# Patient Record
Sex: MALE | Race: WHITE | NOT HISPANIC OR LATINO | URBAN - METROPOLITAN AREA
[De-identification: names, ages, dates, MRNs, and addresses within clinical notes are randomized per-mention and may not be internally consistent; named-entity substitution may affect disease eponyms.]

---

## 2022-11-02 ENCOUNTER — CLINICAL SUPPORT (OUTPATIENT)
Dept: REHABILITATION | Facility: HOSPITAL | Age: 57
End: 2022-11-02
Attending: SURGERY
Payer: MEDICAID

## 2022-11-02 DIAGNOSIS — M79.89 SWELLING OF RIGHT HAND: ICD-10-CM

## 2022-11-02 DIAGNOSIS — M19.041 ARTHROPATHY OF RIGHT HAND: ICD-10-CM

## 2022-11-02 DIAGNOSIS — M79.641 RIGHT HAND PAIN: ICD-10-CM

## 2022-11-02 DIAGNOSIS — M25.641 STIFFNESS OF RIGHT HAND JOINT: ICD-10-CM

## 2022-11-02 DIAGNOSIS — Z78.9 DIFFICULTY WITH ACTIVITIES OF DAILY LIVING: ICD-10-CM

## 2022-11-02 DIAGNOSIS — M19.041 ARTHROPATHY OF RIGHT HAND: Primary | ICD-10-CM

## 2022-11-02 PROCEDURE — L3806 WHFO W/JOINT(S) CUSTOM FAB: HCPCS | Mod: PN

## 2022-11-02 NOTE — PROGRESS NOTES
[facility]  Orthosis Note - Certificate of Medical Necessity      Date: 11/2/2022  Name: Donovan Bowen  Clinic Number: [id number]    Medical Diagnosis: Arthritis of right hand [M19.041]   Therapy Diagnosis:   Encounter Diagnoses   Name Primary?    Arthropathy of right hand     Swelling of right hand     Right hand pain     Stiffness of right hand joint     Difficulty with activities of daily living      Precautions: Standard and Weightbearing    Physician: Sid Esquivel MD  Physician Orders: Fabricate splint, eval and treat   Date of Order: 10/25/22    Insurance Authorization period Expiration: 12/28/22    Time In:1530  Time Out: 1645  [facility] Level II Code and Description:     Subjective     Involved Side: Right  Date of Onset: 10/26/22  Date of Surgery: 10/26/22  Surgical Procedure: MP Arthoplasty for PA  History of Current Condition: Donovan reports a long history of long finger metacarpophalangeal joint pain following an injury ~10 years prior. Dr. Esquivel performed a right long finger metacarpophalangeal joint arthroplasty on 10/26/22. He presents to OT today for fabrication of a custom dynamic extension orthosis.   Previous Therapy: N    Past Medical History/Physical Systems Review:   No past medical history on file.  Donovan Bowen  has no past surgical history on file.    Donovan currently has no medications in their medication list.    Review of patient's allergies indicates:  Not on File     Objective      None Pitting Mild Moderate Severe   Edema     X        No Deficits Mild Deficits Mod Deficits Severe Deficits   ROM    X         Custom Fabricated Orthosis  The custom orthosis was fabricated as requested using low-temperature plastic material. A pattern was measured then cut and custom molded to accommodate this individual patient.   () Long Arm Orthosis  (X) Forearm-Based Orthosis  () Radial-Based Orthosis  () Ulnar-Based Orthosis  () Hand-Based Orthosis  ()  Finger-Based Orthosis  () Full Circumference  () Half Circumference  () Static  () Static Progressive  () Dynamic    Purpose of Orthosis  () Protect Recent Injury   (X) Protect Surgical Procedure  (X) Maintain Joint Positioning  () Increase Motion    HEP / Patient Instructions      See patient instructions section for orthosis instructions.  Patient was instructed verbally, signed, and provided with copy of written proof of delivery as well as information regarding wear schedule, care, and precautions of orthosis.    Assessment      The custom orthosis appeared to fit well with no problems noted. There were no complaints of discomfort from the patient at this time. The patient demonstrated competency with independently doffing and donning orthosis.    Plan     Frequency and Duration: 3x/ week   Orthosis to be worn at all times except for hygiene and to perform home exercise program.  Orthosis checks PRN.    I certify that the orthosis was performed by or under the direct supervision of a qualified Occupational Therapist.   Therapist: BARBARA Salcedo

## 2022-11-07 PROBLEM — M79.89 SWELLING OF RIGHT HAND: Status: ACTIVE | Noted: 2022-11-07

## 2022-11-07 PROBLEM — M25.641 STIFFNESS OF RIGHT HAND JOINT: Status: ACTIVE | Noted: 2022-11-07

## 2022-11-07 PROBLEM — Z78.9 DIFFICULTY WITH ACTIVITIES OF DAILY LIVING: Status: ACTIVE | Noted: 2022-11-07

## 2022-11-07 PROBLEM — M79.641 RIGHT HAND PAIN: Status: ACTIVE | Noted: 2022-11-07

## 2022-11-09 ENCOUNTER — CLINICAL SUPPORT (OUTPATIENT)
Dept: REHABILITATION | Facility: HOSPITAL | Age: 57
End: 2022-11-09
Payer: MEDICAID

## 2022-11-09 DIAGNOSIS — Z78.9 DIFFICULTY WITH ACTIVITIES OF DAILY LIVING: ICD-10-CM

## 2022-11-09 DIAGNOSIS — M79.641 RIGHT HAND PAIN: ICD-10-CM

## 2022-11-09 DIAGNOSIS — M25.641 STIFFNESS OF RIGHT HAND JOINT: ICD-10-CM

## 2022-11-09 DIAGNOSIS — M79.89 SWELLING OF RIGHT HAND: Primary | ICD-10-CM

## 2022-11-09 PROCEDURE — 97166 OT EVAL MOD COMPLEX 45 MIN: CPT | Mod: PN

## 2022-11-09 NOTE — PATIENT INSTRUCTIONS
"[facility], OCCUPATIONAL THERAPY  HOME EXERCISE PROGRAM         AROM: Elbow Flexion / Extension        Bend and straighten elbow in 3 different positions:   thumb up, palm up, palm down.      AROM: Supination / Pronation   With your elbow by your side, turn your   palm up then turn your palm down.      AROM: Wrist Flexion / Extension               Bend your wrist forward and back as far as possible.          AROM: Wrist Radial / Ulnar Deviation  Bend your wrist from side to side as far as possible.            AROM: MCP and PIP Flexion / Extension ("Straight Fist")  Bend your bottom and middle knuckles, keeping the tips of your fingers straight.   Try to touch the pads of your fingers on your palm. Hold 3 seconds.   Straighten your fingers.       AROM: Composite Flexion / Extension ("Full Fist")  Bend every joint in your hand into a fist. Hold 3 seconds.   Straighten your fingers.             AROM: Abduction / Adduction  With hand flat on table, spread all fingers apart,   then bring them together as close as possible.                            AROM: Opposition   Touch tip of thumb to nail tip of each  finger in turn, making an "O" shape.                              AROM: Composite Flesion ("Pinky Slides")  Touch thumb to tip of small finger. Slide thumb down   small finger into palm.       " 0 = independent

## 2022-11-09 NOTE — PLAN OF CARE
"  [facility] Occupational Therapy  Initial Evaluation     Date: 11/9/2022  Patient: Donovan Bowen  Chart Number: [id number]  Referring Physician: Sid Esquivle MD  Therapy Diagnosis:   1. Swelling of right hand        2. Right hand pain        3. Stiffness of right hand joint        4. Difficulty with activities of daily living            Physician Orders: Eval and treat   Medical Diagnosis: Arthritis of right hand    Arthritis of right hand [M19.041]    Evaluation Date: 11/9/2022  Plan of Care Certification Date: 12/28/22  Authorization Period: 11/4/22 - 12/31/22  Surgery Date and Procedure: 10/26/22   UT ARTHROPLASTY MC-P JT,IMPLANT    ARTHROPLASTY HAND    Date of Return to MD: TBD    Visit #: 1 / 20  Time In: 1445  Time Out: 1530  Total Billable Time: 45    Precautions: Standard , no long finger metacarpophalangeal joint extension    Subjective     History of Current Condition: Donovan reports a long history of long finger metacarpophalangeal joint pain following an injury ~10 years prior. Dr. Esquivel performed a right long finger metacarpophalangeal joint arthroplasty on 10/26/22. Ongoing 11/2/22 he presented to OT for post-op dressing removal and fabrication of a custom dynamic extension orthosis. He reports suture removal yesterday 11/9.     Involved Side: R  Dominant Side: R  Date of Onset: 10/26/22 (DOS)  Imaging: X-Ray  Previous Therapy: N    Patient's Goals for Therapy: Restore pain free functional use of the left upper extremity     Pain:  Functional Pain Scale Rating 0-10:   1/10 on average  1/10 at best  2/10 at worst  Location: MCPs digits 2-4  Description: aching, but "it feels better now than it did before the surgery."  Aggravating Factors: extension   Easing Factors: NSAIDs    Occupation:     Working presently: light duty  Duties: shop owner    Functional Limitations/Social History:    Prior Level of Function: Independent   Current Level of Function: Min A from spouse for " basic activities of daily living     Home/Living environment : lives with spouse   DME: dynamic extension splint      Leisure: fishing   Driving: Y    Past Medical History/Physical Systems Review:   Donovan Bowen  has no past medical history on file.    Donovan Bowen  has no past surgical history on file.    Donovan currently has no medications in their medication list.    Review of patient's allergies indicates:  Not on File       Objective     Mental status: alert, oriented x3    Observation:   Wound edge are well approximated. Sutures came out yesterday (11/8) and steristrips in place. Mild bruising.       Wound Assessment:   Size: 6 cm  Location: right long finger metacarpophalangeal joint     Edema: Circumferential measurements: In centimters     11/9/2022 11/9/2022    Left Right   Index:       P1 7.4 7.7   Long:       P1 7.4 8.8   P2 6.6 7.4      Right  11/9 Left  11/9   MPs 23.8 cm 22.5 cm   PPC (Proximal Cobb Crease) 23.9 cm 22.7 cm   PWC (Proximal Wrist Crease) 18.9 cm 18.8 cm       Range of Motion:     Wrist Left  11/9 Right  11/9   Extension 65 42   Flexion 68 30   Ulnar Deviation 40 20   Radial Deviation 20 20       Left Hand Finger ROM LONG  11/9  RING  11/9    MP  83 85   PIP  102 100   DIP  74 78     Right Hand Finger ROM LONG   11/9 RING  11/9   MP  50 45   PIP  53 73   DIP  25 30        Strength: (SETH Dynamometer in psi.)      11/9/2022 11/9/2022    Left Right   Rung  NT*       Pinch Strength (Measured in psi)     11/9/2022 11/9/2022    Left Right   Key Pinch 22  NT *   3pt Pinch 26  NT *   2pt Pinch 12  NT *     *NT due to post surgical precautions      CMS Impairment/Limitation/Restriction for FOTO Hand/Wrist/Finger Survey    Therapist reviewed FOTO scores for Donovan Bowen on 11/9/2022.   FOTO documents entered into [employer] - see Media section.    Limitation Score: Needs to finish FOTO at next visit  Category: Self Care             Treatment     Home Exercise Program/Education:  Issued  HEP (see patient instructions in EMR) and educated on modality use for pain management . Exercises were reviewed and Donovan was able to demonstrate them prior to the end of the session.   Pt received a written copy of exercises to perform at home. Donovan demonstrated good  understanding of the education provided.  Pt was advised to perform these exercises free of pain, and to stop performing them if pain occurs.    Patient/Family Education: role of OT, goals for OT, scheduling/cancellations - pt verbalized understanding. Discussed insurance limitations with patient.    Additional Education provided: use of hot and cold modalities       Assessment     Donovan Bowen is a 57 y.o. male presents with limitations as described in problem list. Patient can benefit from Occupational Therapy services for Iontophoresis, ultrasound, moist heat, therapeutic exercises, home exercise program provied with written instructions, ice and strengthening and orthotics, if deemed necessary . The following goals were discussed with the patient and she is in agreement with them as to be addressed in the treatment plan.    The patient's rehab potential is Good.     Anticipated barriers to occupational therapy: none   Pt has no cultural, educational or language barriers to learning provided.    Profile and History Assessment of Occupational Performance Level of Clinical Decision Making Complexity Score   Occupational Profile:   Donovan Bowen is a 57 y.o. male who is working light duty Donovan Bowen has difficulty with  ADLs and IADLs as listed previously, which  affecting his/her daily functional abilities.      Comorbidities:    has no past medical history on file.    Medical and Therapy History Review:   Expanded               Performance Deficits    Physical:  Joint Mobility  Muscle Power/Strength  Muscle Endurance  Skin Integrity/Scar Formation  Edema   Strength  Pinch Strength  Pain    Cognitive:  No Deficits    Psychosocial:     Habits  Routines  Rituals     Clinical Decision Making:  moderate    Assessment Process:  Problem-Focused Assessments    Modification/Need for Assistance:  Not Necessary    Intervention Selection:  Multiple Treatment Options       moderate  Based on PMHX, co morbidities , data from assessments and functional level of assistance required with task and clinical presentation directly impacting function.         Goals:    LTG's (8 weeks):  1)   Increase right long finger metacarpophalangeal joint flexion to 75 degrees in to increase functional hand use for work duties  2)   Increase  strength to 50 lbs. 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  5)   Pt will return to near to prior level of function for ADLs and household management reporting I or Mod I with ADLs (dressing, feeding, grooming, toileting).   6)   Demonstrate a 1.0 decrease in long finger P1 swelling    STG's (4 weeks)  1)   Patient to be IND with HEP and modalities for pain/edema managment.  2)   ncrease right long finger metacarpophalangeal joint flexion to 60 degrees in to increase functional hand use for work duties  3)   Increase  strength 20 lbs. to improve functional grasp for ADLs/work/leisure activities.   4)   Increase right hand lateral pinch 5 psi's to increase independence with button and FM Coordination.   5)   Patient to be IND wiht Orthotic use, wear and care precautions.   6)   Decrease complaints of pain to  1 out of 10 at worst to increase functional hand use for ADL/work/leisure activities.  7)   Demonstrate a 0.5 decrease in long finger P1 swelling         Plan     Pt to be treated by Occupational Therapy 3x times per week for 8 weeks during the certification period from 11/9/2022 to 12/28/22 to achieve the established goals.      Treatment to include: Paraffin, Fluidotherapy, Manual therapy/joint mobilizations, Modalities for pain management, US 3 mhz, Therapeutic exercises/activities., Iontophoresis with 2.0 cc Dexamethasone, Strengthening, Orthotic Fabrication/Fit/Training, Edema Control, Scar Management, Wound Care, Electrical Modalities, Joint Protection, and Energy Conservation, as well as any other treatments deemed necessary based on the patient's needs or progress.     BARBARA Salcedo

## 2022-11-11 ENCOUNTER — CLINICAL SUPPORT (OUTPATIENT)
Dept: REHABILITATION | Facility: HOSPITAL | Age: 57
End: 2022-11-11
Attending: SURGERY
Payer: MEDICAID

## 2022-11-11 DIAGNOSIS — Z78.9 DIFFICULTY WITH ACTIVITIES OF DAILY LIVING: ICD-10-CM

## 2022-11-11 DIAGNOSIS — M79.641 RIGHT HAND PAIN: ICD-10-CM

## 2022-11-11 DIAGNOSIS — M25.641 STIFFNESS OF RIGHT HAND JOINT: ICD-10-CM

## 2022-11-11 DIAGNOSIS — M79.89 SWELLING OF RIGHT HAND: Primary | ICD-10-CM

## 2022-11-11 PROCEDURE — 97530 THERAPEUTIC ACTIVITIES: CPT | Mod: PN

## 2022-11-11 NOTE — PROGRESS NOTES
"  Occupational Therapy Daily Treatment Note     Name: Donovan Bowen  Clinic Number: [id number]    Therapy Diagnosis:   Encounter Diagnoses   Name Primary?    Swelling of right hand Yes    Right hand pain     Stiffness of right hand joint     Difficulty with activities of daily living      Physician: Sid Esquivel MD    Visit Date: 11/11/2022    Physician Orders: Eval and treat   Medical Diagnosis: Arthritis of right hand    Arthritis of right hand [M19.041]    Evaluation Date: 11/9/2022  Plan of Care Certification Date: 12/28/22  Authorization Period: 11/4/22 - 12/31/22  Surgery Date and Procedure: 10/26/22   MT ARTHROPLASTY MC-P JT,IMPLANT    ARTHROPLASTY HAND    Date of Return to MD: TBD     Visit #: 2 / 20    Time In:1545  Time Out: 1630  Total Billable Time: 45 minutes    Precautions:  Standard, no long finger metacarpophalangeal joint extension    Subjective     Pt reports: "I am really happy with how it is moving and how the pain has decreased."  he was compliant with home exercise program given last session.   Response to previous treatment:Increased ROM and decreased pain  Functional change: Increased ROM and decreased pain    Pain: 3/10  Location: right long finger      Objective       Mental status: alert, oriented x3     Observation:   Wound edge are well approximated. Sutures came out yesterday (11/8) and steristrips in place. Mild bruising.         Wound Assessment:   Size: 6 cm  Location: right long finger metacarpophalangeal joint      Edema: Circumferential measurements: In centimters       11/9/2022 11/9/2022     Left Right   Index:         P1 7.4 7.7   Long:         P1 7.4 8.8   P2 6.6 7.4        Right  11/9 Left  11/9   MPs 23.8 cm 22.5 cm   PPC (Proximal Cobb Crease) 23.9 cm 22.7 cm   PWC (Proximal Wrist Crease) 18.9 cm 18.8 cm         Range of Motion:      Wrist Left  11/9 Right  11/9   Extension 65 42   Flexion 68 30   Ulnar Deviation 40 20   Radial Deviation 20 20         Left Hand Finger " ROM LONG  11/9  RING  11/9    MP  83 85   PIP  102 100   DIP  74 78      Right Hand Finger ROM LONG   11/9 RING  11/9   MP  50 45   PIP  53 73   DIP  25 30          Strength: (SETH Dynamometer in psi.)        11/9/2022 11/9/2022     Left Right   Rung  NT*         Pinch Strength (Measured in psi)       11/9/2022 11/9/2022     Left Right   Key Pinch 22  NT *   3pt Pinch 26  NT *   2pt Pinch 12  NT *      *NT due to post surgical precautions        CMS Impairment/Limitation/Restriction for FOTO Hand/Wrist/Finger Survey     Therapist reviewed FOTO scores for Donovan Bowen on 11/9/2022.   FOTO documents entered into [employer] - see Media section.     Limitation Score: Needs to finish FOTO at next visit  Category: Self Care                 Treatment       Donovan received the following direct contact modalities after being cleared for contraindications for 10 minutes:  -moist hot pack to decrease pain and increase extensibility     Donovan received the following manual therapy techniques for 15 minutes:   -STM  - retrograde massage on digits 2-5 and palm to decrease edema  - intrinsic hand stretch   - PROM digit flexion within pain free range     Donovan received therapeutic exercises for 20 minutes including:  -HEP exercises  - pom pom  with thumb and long finger  - joint blocking to digits 2-5.     Home Exercises and Education Provided     Education provided:   - Progress towards goals     Written Home Exercises Provided: Patient instructed to cont prior HEP.  Exercises were reviewed and Donovan was able to demonstrate them prior to the end of the session.  Donovan demonstrated good  understanding of the HEP provided.   .   See EMR under Patient Instructions for exercises provided prior visit.        Assessment     Pt would continue to benefit from skilled OT. Pt was able to increase digit ROM after moist heat and retrograde massage preformed by OT. Pt has steri strips on incision and is unable to extend long  finger of right hand at this time. Pt is steadily progressing towards LTGs at this time.       Donovan is progressing well towards his goals and there are no updates to goals at this time. Pt prognosis is Good.     Pt will continue to benefit from skilled outpatient occupational therapy to address the deficits listed in the problem list on initial evaluation provide pt/family education and to maximize pt's level of independence in the home and community environment.     Anticipated barriers to occupational therapy: NONE    Pt's spiritual, cultural and educational needs considered and pt agreeable to plan of care and goals.    Goals:    LTG's (8 weeks):  1)   Increase right long finger metacarpophalangeal joint flexion to 75 degrees in to increase functional hand use for work duties  2)   Increase  strength to 50 lbs. 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  5)   Pt will return to near to prior level of function for ADLs and household management reporting I or Mod I with ADLs (dressing, feeding, grooming, toileting).   6)   Demonstrate a 1.0 decrease in long finger P1 swelling     STG's (4 weeks)  1)   Patient to be IND with HEP and modalities for pain/edema managment.  2)   ncrease right long finger metacarpophalangeal joint flexion to 60 degrees in to increase functional hand use for work duties  3)   Increase  strength 20 lbs. to improve functional grasp for ADLs/work/leisure activities.   4)   Increase right hand lateral pinch 5 psi's to increase independence with button and FM Coordination.   5)   Patient to be IND wiht Orthotic use, wear and care precautions.   6)   Decrease complaints of pain to  1 out of 10 at worst to increase functional hand use for ADL/work/leisure activities.  7)   Demonstrate a  0.5 decrease in long finger P1 swelling            Plan      Pt to be treated by Occupational Therapy 3x times per week for 8 weeks during the certification period from 11/9/2022 to 12/28/22 to achieve the established goals.      Treatment to include: Paraffin, Fluidotherapy, Manual therapy/joint mobilizations, Modalities for pain management, US 3 mhz, Therapeutic exercises/activities., Iontophoresis with 2.0 cc Dexamethasone, Strengthening, Orthotic Fabrication/Fit/Training, Edema Control, Scar Management, Wound Care, Electrical Modalities, Joint Protection, and Energy Conservation, as well as any other treatments deemed necessary based on the patient's needs or progress.         Updates/Grading for next session: continue with current POC      Zahira Muse OTR/L

## 2022-11-16 ENCOUNTER — CLINICAL SUPPORT (OUTPATIENT)
Dept: REHABILITATION | Facility: HOSPITAL | Age: 57
End: 2022-11-16
Attending: SURGERY
Payer: MEDICAID

## 2022-11-16 DIAGNOSIS — M79.641 RIGHT HAND PAIN: ICD-10-CM

## 2022-11-16 DIAGNOSIS — Z78.9 DIFFICULTY WITH ACTIVITIES OF DAILY LIVING: ICD-10-CM

## 2022-11-16 DIAGNOSIS — M79.89 SWELLING OF RIGHT HAND: Primary | ICD-10-CM

## 2022-11-16 DIAGNOSIS — M25.641 STIFFNESS OF RIGHT HAND JOINT: ICD-10-CM

## 2022-11-16 PROCEDURE — 97530 THERAPEUTIC ACTIVITIES: CPT | Mod: PN

## 2022-11-16 NOTE — PROGRESS NOTES
"  Occupational Therapy Daily Treatment Note     Name: Donovan Bowen  Clinic Number: [id number]    Therapy Diagnosis:   Encounter Diagnoses   Name Primary?    Swelling of right hand Yes    Right hand pain     Stiffness of right hand joint     Difficulty with activities of daily living        Physician: Sid Esquivel MD    Visit Date: 11/16/2022    Physician Orders: Eval and treat   Medical Diagnosis: Arthritis of right hand    Arthritis of right hand [M19.041]    Evaluation Date: 11/9/2022  Plan of Care Certification Date: 12/28/22  Authorization Period: 11/4/22 - 12/31/22  Surgery Date and Procedure: 10/26/22   [place] ARTHROPLASTY [facility]-P JT,IMPLANT    ARTHROPLASTY HAND    Date of Return to MD: TBD     Visit #: 3 / 20    Time In:1400  Time Out: 0245  Total Billable Time: 45 minutes    Precautions:  Standard, no long finger metacarpophalangeal joint extension  3 weeks post of as of 11/16    Subjective     Pt reports: "I feel like it's improving slowly but surely. It feels sore at times."  he was compliant with home exercise program given last session.   Response to previous treatment:Increased ROM and decreased pain  Functional change: Increased ROM and decreased pain    Pain: 1-2/10  Location: right long finger      Objective       Mental status: alert, oriented x3     Observation:   Wound edge are well approximated. Sutures came out yesterday (11/8) and steristrips in place. Mild bruising.         Wound Assessment:   Size: 6 cm  Location: right long finger metacarpophalangeal joint      Edema: Circumferential measurements: In centimters       11/9/2022 11/9/2022     Left Right   Index:         P1 7.4 7.7   Long:         P1 7.4 8.8   P2 6.6 7.4        Right  11/9 Left  11/9   MPs 23.8 cm 22.5 cm   PPC (Proximal Cobb Crease) 23.9 cm 22.7 cm   PWC (Proximal Wrist Crease) 18.9 cm 18.8 cm         Range of Motion:      Wrist Left  11/9 Right  11/9   Extension 65 42   Flexion 68 30   Ulnar Deviation 40 20   Radial Deviation " 20 20         Left Hand Finger ROM LONG  11/9  RING  11/9    MP  83 85   PIP  102 100   DIP  74 78      Right Hand Finger ROM LONG   11/9 RING  11/9   MP  50 45   PIP  53 73   DIP  25 30          Strength: (SETH Dynamometer in psi.)        11/9/2022 11/9/2022     Left Right   Rung  NT*         Pinch Strength (Measured in psi)       11/9/2022 11/9/2022     Left Right   Key Pinch 22  NT *   3pt Pinch 26  NT *   2pt Pinch 12  NT *      *NT due to post surgical precautions        CMS Impairment/Limitation/Restriction for FOTO Hand/Wrist/Finger Survey     Therapist reviewed FOTO scores for Donovan Bowen on 11/9/2022.   FOTO documents entered into [employer] - see Media section.     Limitation Score: Needs to finish FOTO at next visit  Category: Self Care                 Treatment       Donovan received the following direct contact modalities after being cleared for contraindications for 10 minutes:  - Paraffin and moist hot pack to decrease pain and increase extensibility     Donovan received the following manual therapy techniques for 15 minutes:   -STM to scar and surrounding tissues   - intrinsic stretch digits 2-5  - PROM long finger digit flexion within pain free range   - passive range of motion long finger proximal interphalangeal joint extension with MP in protected position (flexion)    Donovan received therapeutic exercises for 20 minutes including:  -HEP exercises  - small pom pom  with long finger against palm   - Wrist 3 ways over wedge holding pvc 1 min each   - joint blocking to digits 2-5  - wrist maze 2 mins  - red medicine ball - wrist circumduction 2 mins   - Issued size XX-left digit compression sleeve for long finger       Home Exercises and Education Provided     Education provided:   - Progress towards goals     Written Home Exercises Provided: Patient instructed to cont prior HEP.  Exercises were reviewed and Donovan was able to demonstrate them prior to the end of the session.  Donovan  demonstrated good  understanding of the HEP provided.   .   See EMR under Patient Instructions for exercises provided prior visit.        Assessment     Pt tolerated session well. He reports less stiffness and better range of motion with heat modalities. His pain reports remain low with soreness - as expected for 3 weeks post-op. He is motivated in therapy and compliant with orthotic use and home exercise program.     Donovan is progressing well towards his goals and there are no updates to goals at this time. Pt prognosis is Good.     Pt will continue to benefit from skilled outpatient occupational therapy to address the deficits listed in the problem list on initial evaluation provide pt/family education and to maximize pt's level of independence in the home and community environment.     Anticipated barriers to occupational therapy: NONE    Pt's spiritual, cultural and educational needs considered and pt agreeable to plan of care and goals.    Goals:    LTG's (8 weeks):  1)   Increase right long finger metacarpophalangeal joint flexion to 75 degrees in to increase functional hand use for work duties Ongoing   2)   Increase  strength to 50 lbs. 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 Ongoing   5)   Pt will return to near to prior level of function for ADLs and household management reporting I or Mod I with ADLs (dressing, feeding, grooming, toileting). Ongoing   6)   Demonstrate a 1.0 decrease in long finger P1 swelling Ongoing      STG's (4 weeks)  1)   Patient to be IND with HEP and modalities for pain/edema managment. Ongoing   2)   ncrease right long finger metacarpophalangeal joint flexion to 60 degrees in to increase functional hand use for work duties Ongoing   3)   Increase   strength 20 lbs. to improve functional grasp for ADLs/work/leisure activities.  Ongoing   4)   Increase right hand lateral pinch 5 psi's to increase independence with button and FM Coordination.  Ongoing   5)   Patient to be IND wiht Orthotic use, wear and care precautions. Ongoing   6)   Decrease complaints of pain to  1 out of 10 at worst to increase functional hand use for ADL/work/leisure activities. Ongoing   7)   Demonstrate a 0.5 decrease in long finger P1 swelling  Ongoing            Plan      Continue to work towards goals per protocol.     Pt to be treated by Occupational Therapy 3x times per week for 8 weeks during the certification period from 11/9/2022 to 12/28/22 to achieve the established goals.      Treatment to include: Paraffin, Fluidotherapy, Manual therapy/joint mobilizations, Modalities for pain management, US 3 mhz, Therapeutic exercises/activities., Iontophoresis with 2.0 cc Dexamethasone, Strengthening, Orthotic Fabrication/Fit/Training, Edema Control, Scar Management, Wound Care, Electrical Modalities, Joint Protection, and Energy Conservation, as well as any other treatments deemed necessary based on the patient's needs or progress.         Updates/Grading for next session: continue with current POC      Fatmata Dominique OTR/L

## 2022-11-17 NOTE — PROGRESS NOTES
"  Occupational Therapy Daily Treatment Note     Name: Donovan Bowen  Clinic Number: [id number]    Therapy Diagnosis:   Encounter Diagnoses   Name Primary?    Swelling of right hand Yes    Right hand pain     Stiffness of right hand joint     Difficulty with activities of daily living          Physician: Sid Esquivel MD    Visit Date: 11/18/2022    Physician Orders: Eval and treat   Medical Diagnosis: Arthritis of right hand    Arthritis of right hand [M19.041]    Evaluation Date: 11/9/2022  Plan of Care Certification Date: 12/28/22  Authorization Period: 11/4/22 - 12/31/22  Surgery Date and Procedure: 10/26/22   [place] ARTHROPLASTY MC-P JT,IMPLANT    ARTHROPLASTY HAND    Date of Return to MD: TBD     Visit #: 4/ 20    Time In:1345  Time Out:1430  Total Billable Time: 45 minutes    Precautions:  Standard, no long finger metacarpophalangeal joint extension  3 weeks post of as of 11/16    Subjective     Pt reports: "It feels better than it has. I've been able to do more."  he was compliant with home exercise program given last session.   Response to previous treatment:Increased ROM and decreased pain  Functional change: Increased ROM and decreased pain    Pain: 1-2/10  Location: right long finger      Objective       Mental status: alert, oriented x3     Observation:   Wound edge are well approximated. Sutures came out yesterday (11/8) and steristrips in place. Mild bruising.         Wound Assessment:   Size: 6 cm  Location: right long finger metacarpophalangeal joint      Edema: Circumferential measurements: In centimters       11/9/2022 11/9/2022     Left Right   Index:         P1 7.4 7.7   Long:         P1 7.4 8.8   P2 6.6 7.4        Right  11/9 Left  11/9   MPs 23.8 cm 22.5 cm   PPC (Proximal Cobb Crease) 23.9 cm 22.7 cm   PWC (Proximal Wrist Crease) 18.9 cm 18.8 cm         Range of Motion:      Wrist Left  11/9 Right  11/9   Extension 65 42   Flexion 68 30   Ulnar Deviation 40 20   Radial Deviation 20 20       "   Left Hand Finger ROM LONG  11/9  RING  11/9    MP  83 85   PIP  102 100   DIP  74 78      Right Hand Finger ROM LONG   11/9 RING  11/9   MP  50 45   PIP  53 73   DIP  25 30          Strength: (SETH Dynamometer in psi.)        11/9/2022 11/9/2022     Left Right   Rung  NT*         Pinch Strength (Measured in psi)       11/9/2022 11/9/2022     Left Right   Key Pinch 22  NT *   3pt Pinch 26  NT *   2pt Pinch 12  NT *      *NT due to post surgical precautions        CMS Impairment/Limitation/Restriction for FOTO Hand/Wrist/Finger Survey     Therapist reviewed FOTO scores for Donovan Bowen on 11/9/2022.   FOTO documents entered into [employer] - see Media section.     Limitation Score: Needs to finish FOTO at next visit  Category: Self Care                 Treatment       Donovan received the following direct contact modalities after being cleared for contraindications for 10 minutes:  - Paraffin and moist hot pack to decrease pain and increase extensibility     Donovan received the following manual therapy techniques for 15 minutes:   -STM to scar and surrounding tissues   - intrinsic stretch digits 2-5  - PROM long finger digit flexion within pain free range   - passive range of motion long finger proximal interphalangeal joint extension with MP in protected position (flexion)    Donovan received therapeutic exercises for 20 minutes including:  -HEP exercises  - small pom pom  with long finger against palm   - Wrist 3 ways over wedge holding pvc 1 min each   - joint blocking to digits 2-5  - wrist maze 2 mins  - red medicine ball - wrist circumduction 2 mins   - Issued size XX-left digit compression sleeve for long finger       Home Exercises and Education Provided     Education provided:   - Progress towards goals     Written Home Exercises Provided: Patient instructed to cont prior HEP.  Exercises were reviewed and Donovan was able to demonstrate them prior to the end of the session.  Donovan demonstrated  good  understanding of the HEP provided.   .   See EMR under Patient Instructions for exercises provided prior visit.        Assessment     Pt tolerated session well. He reports less stiffness and better range of motion with heat modalities. Pt was able to make a fist with his long fingertip to his palm. His pain reports remain low with soreness - as expected for 3 weeks post-op. He is motivated in therapy and compliant with orthotic use and home exercise program.     Donovan is progressing well towards his goals and there are no updates to goals at this time. Pt prognosis is Good.     Pt will continue to benefit from skilled outpatient occupational therapy to address the deficits listed in the problem list on initial evaluation provide pt/family education and to maximize pt's level of independence in the home and community environment.     Anticipated barriers to occupational therapy: NONE    Pt's spiritual, cultural and educational needs considered and pt agreeable to plan of care and goals.    Goals:    LTG's (8 weeks):  1)   Increase right long finger metacarpophalangeal joint flexion to 75 degrees in to increase functional hand use for work duties Ongoing   2)   Increase  strength to 50 lbs. 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 Ongoing   5)   Pt will return to near to prior level of function for ADLs and household management reporting I or Mod I with ADLs (dressing, feeding, grooming, toileting). Ongoing   6)   Demonstrate a 1.0 decrease in long finger P1 swelling Ongoing      STG's (4 weeks)  1)   Patient to be IND with HEP and modalities for pain/edema managment. Ongoing   2)   ncrease right long finger metacarpophalangeal joint flexion to 60 degrees in to increase functional hand  use for work duties Ongoing   3)   Increase  strength 20 lbs. to improve functional grasp for ADLs/work/leisure activities.  Ongoing   4)   Increase right hand lateral pinch 5 psi's to increase independence with button and FM Coordination.  Ongoing   5)   Patient to be IND wiht Orthotic use, wear and care precautions. Ongoing   6)   Decrease complaints of pain to  1 out of 10 at worst to increase functional hand use for ADL/work/leisure activities. Ongoing   7)   Demonstrate a 0.5 decrease in long finger P1 swelling  Ongoing            Plan      Continue to work towards goals per protocol.     Pt to be treated by Occupational Therapy 3x times per week for 8 weeks during the certification period from 11/9/2022 to 12/28/22 to achieve the established goals.      Treatment to include: Paraffin, Fluidotherapy, Manual therapy/joint mobilizations, Modalities for pain management, US 3 mhz, Therapeutic exercises/activities., Iontophoresis with 2.0 cc Dexamethasone, Strengthening, Orthotic Fabrication/Fit/Training, Edema Control, Scar Management, Wound Care, Electrical Modalities, Joint Protection, and Energy Conservation, as well as any other treatments deemed necessary based on the patient's needs or progress.         Updates/Grading for next session: continue with current POC      Zahira Muse OTR/L

## 2022-11-18 ENCOUNTER — CLINICAL SUPPORT (OUTPATIENT)
Dept: REHABILITATION | Facility: HOSPITAL | Age: 57
End: 2022-11-18
Attending: SURGERY
Payer: MEDICAID

## 2022-11-18 DIAGNOSIS — Z78.9 DIFFICULTY WITH ACTIVITIES OF DAILY LIVING: ICD-10-CM

## 2022-11-18 DIAGNOSIS — M79.641 RIGHT HAND PAIN: ICD-10-CM

## 2022-11-18 DIAGNOSIS — M25.641 STIFFNESS OF RIGHT HAND JOINT: ICD-10-CM

## 2022-11-18 DIAGNOSIS — M79.89 SWELLING OF RIGHT HAND: Primary | ICD-10-CM

## 2022-11-18 PROCEDURE — 97530 THERAPEUTIC ACTIVITIES: CPT | Mod: PN

## 2022-11-21 ENCOUNTER — CLINICAL SUPPORT (OUTPATIENT)
Dept: REHABILITATION | Facility: HOSPITAL | Age: 57
End: 2022-11-21
Attending: SURGERY
Payer: MEDICAID

## 2022-11-21 DIAGNOSIS — M79.641 RIGHT HAND PAIN: ICD-10-CM

## 2022-11-21 DIAGNOSIS — M79.89 SWELLING OF RIGHT HAND: Primary | ICD-10-CM

## 2022-11-21 DIAGNOSIS — Z78.9 DIFFICULTY WITH ACTIVITIES OF DAILY LIVING: ICD-10-CM

## 2022-11-21 DIAGNOSIS — M25.641 STIFFNESS OF RIGHT HAND JOINT: ICD-10-CM

## 2022-11-21 PROCEDURE — 97530 THERAPEUTIC ACTIVITIES: CPT | Mod: PN

## 2022-11-21 NOTE — PROGRESS NOTES
"  Occupational Therapy Daily Treatment Note     Name: Donovan Bowen  Clinic Number: [id number]    Therapy Diagnosis:   Encounter Diagnoses   Name Primary?    Swelling of right hand Yes    Right hand pain     Stiffness of right hand joint     Difficulty with activities of daily living      Physician: Sid Esquivel MD    Visit Date: 11/21/2022    Physician Orders: Eval and treat   Medical Diagnosis: Arthritis of right hand    Arthritis of right hand [M19.041]    Evaluation Date: 11/9/2022  Plan of Care Certification Date: 12/28/22  Authorization Period: 11/4/22 - 12/31/22  Surgery Date and Procedure: 10/26/22   [place] ARTHROPLASTY MC-P JT,IMPLANT    ARTHROPLASTY HAND    Date of Return to MD: TBD     Visit #: 5(+1)/ 20 FOTO 48%    Time In:3:25  Time Out:4:10  Total Billable Time: 45 minutes    Precautions:  Standard, no long finger metacarpophalangeal joint extension  4 weeks post of as of 11/23    Subjective     Pt reports: "I feel like I am moving so much better."  he was compliant with home exercise program given last session.   Response to previous treatment:low pain  Functional change: increased range of motion, decreased edema, improved FOTO score    Pain: 1/10  Location: right long finger      Objective     Mental status: alert, oriented x3     Observation:   Wound edge are well approximated. Sutures came out yesterday (11/8) and steristrips in place. Mild bruising.      Wound Assessment:   Size: 6 cm  Location: right long finger metacarpophalangeal joint      Edema: Circumferential measurements: In centimters       11/9/2022 11/9/2022 11/21/22     Left Right Right   Index:          P1 7.4 7.7 7.2   Long:          P1 7.4 8.8 7.6   P2 6.6 7.4 7.1        Right  11/9 Left  11/9 Right  11/21   MPs 23.8 cm 22.5 cm 22 cm   PPC (Proximal Cobb Crease) 23.9 cm 22.7 cm 22 cm   PWC (Proximal Wrist Crease) 18.9 cm 18.8 cm 18 cm         Range of Motion:      Wrist Left  11/9 Right  11/9 Right  11/21   Extension 65 42 62 "   Flexion 68 30 60   Ulnar Deviation 40 20 20   Radial Deviation 20 20 20         Left Hand Finger ROM LONG  11/9  RING  11/9    MP  83 85   PIP  102 100   DIP  74 78      Right Hand Finger ROM LONG   11/9 RING  11/9 Long  11/21 Ring  11/21   MP  50 45 55 65   PIP  53 73 86 95   DIP  25 30 53 61   ARGUELLES 128 148 194 221          Strength: (SETH Dynamometer in psi.)        11/9/2022 11/9/2022     Left Right   Rung  NT*         Pinch Strength (Measured in psi)       11/9/2022 11/9/2022     Left Right   Key Pinch 22  NT *   3pt Pinch 26  NT *   2pt Pinch 12  NT *      *NT due to post surgical precautions     Treatment     Donovan received the following direct contact modalities after being cleared for contraindications for 10 minutes:  - Paraffin and moist hot pack to decrease pain and increase extensibility     Donovan received the following manual therapy techniques for 15 minutes:   -STM to scar and surrounding tissues   - intrinsic stretch digits 2-5  - PROM long finger digit flexion within pain free range   - passive range of motion long finger proximal interphalangeal joint extension with MP in protected position (flexion)    Donovan received therapeutic exercises for 20 minutes including:  - TGE's 30x  - finger spreads 30x  - pinky slides 30x  - finger opposition 30x  - small pom pom  with long finger against palm   - Wrist 3 ways over wedge holding pvc 1 min each   - joint blocking to digits 2-5  - wrist maze 2 mins    Home Exercises and Education Provided     Education provided:   - Progress towards goals     Written Home Exercises Provided: Patient instructed to cont prior HEP.  Exercises were reviewed and Donovan was able to demonstrate them prior to the end of the session.  Donovan demonstrated good  understanding of the HEP provided.      See EMR under Patient Instructions for exercises provided prior visit.      Assessment     Pt with good participation this date. Pain report low throughout  session. Dense sacr tissue noted at incision however responded well to dycem and scra extractor. Range of motion significantly improved throughout Right hand and wrist since initial evaluation. Pt also demonstrating improved edema and FOTO score which has increased his functional use. He is motivated in therapy and compliant with orthotic use and home exercise program.     Donovan is progressing well towards his goals and there are no updates to goals at this time. Pt prognosis is Good.     Pt will continue to benefit from skilled outpatient occupational therapy to address the deficits listed in the problem list on initial evaluation provide pt/family education and to maximize pt's level of independence in the home and community environment.     Anticipated barriers to occupational therapy: NONE    Pt's spiritual, cultural and educational needs considered and pt agreeable to plan of care and goals.    Goals:    LTG's (8 weeks):  1)   Increase right long finger metacarpophalangeal joint flexion to 75 degrees in to increase functional hand use for work duties Ongoing   2)   Increase  strength to 50 lbs. 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 Ongoing   5)   Pt will return to near to prior level of function for ADLs and household management reporting I or Mod I with ADLs (dressing, feeding, grooming, toileting). Ongoing   6)   Demonstrate a 1.0 decrease in long finger P1 swelling Ongoing      STG's (4 weeks)  1)   Patient to be IND with HEP and modalities for pain/edema managment. Ongoing   2)   ncrease right long finger metacarpophalangeal joint flexion to 60 degrees in to increase functional hand use for work duties Ongoing   3)   Increase  strength 20 lbs. to improve functional grasp for  ADLs/work/leisure activities.  Ongoing   4)   Increase right hand lateral pinch 5 psi's to increase independence with button and FM Coordination.  Ongoing   5)   Patient to be IND wiht Orthotic use, wear and care precautions. Ongoing   6)   Decrease complaints of pain to  1 out of 10 at worst to increase functional hand use for ADL/work/leisure activities. Ongoing   7)   Demonstrate a 0.5 decrease in long finger P1 swelling  Ongoing            Plan      Continue to work towards goals per protocol.     Pt to be treated by Occupational Therapy 3x times per week for 8 weeks during the certification period from 11/9/2022 to 12/28/22 to achieve the established goals.      Treatment to include: Paraffin, Fluidotherapy, Manual therapy/joint mobilizations, Modalities for pain management, US 3 mhz, Therapeutic exercises/activities., Iontophoresis with 2.0 cc Dexamethasone, Strengthening, Orthotic Fabrication/Fit/Training, Edema Control, Scar Management, Wound Care, Electrical Modalities, Joint Protection, and Energy Conservation, as well as any other treatments deemed necessary based on the patient's needs or progress.         Updates/Grading for next session: continue with current POC      BARBARA Mahan

## 2022-11-23 ENCOUNTER — CLINICAL SUPPORT (OUTPATIENT)
Dept: REHABILITATION | Facility: HOSPITAL | Age: 57
End: 2022-11-23
Attending: SURGERY
Payer: MEDICAID

## 2022-11-23 DIAGNOSIS — Z78.9 DIFFICULTY WITH ACTIVITIES OF DAILY LIVING: ICD-10-CM

## 2022-11-23 DIAGNOSIS — M25.641 STIFFNESS OF RIGHT HAND JOINT: ICD-10-CM

## 2022-11-23 DIAGNOSIS — M79.89 SWELLING OF RIGHT HAND: Primary | ICD-10-CM

## 2022-11-23 DIAGNOSIS — M79.641 RIGHT HAND PAIN: ICD-10-CM

## 2022-11-23 PROCEDURE — 97150 GROUP THERAPEUTIC PROCEDURES: CPT | Mod: GP,PN

## 2022-11-23 NOTE — PROGRESS NOTES
Occupational Therapy Daily Treatment Note     Name: Donovan Boewn  Clinic Number: [id number]    Therapy Diagnosis:   Encounter Diagnoses   Name Primary?    Swelling of right hand Yes    Right hand pain     Stiffness of right hand joint     Difficulty with activities of daily living      Physician: Sid Esquivel MD    Visit Date: 11/23/2022    Physician Orders: Eval and treat   Medical Diagnosis: Arthritis of right hand    Arthritis of right hand [M19.041]    Evaluation Date: 11/9/2022  Plan of Care Certification Date: 12/28/22  Authorization Period: 11/4/22 - 12/31/22  Surgery Date and Procedure: 10/26/22   [place] ARTHROPLASTY MC-P JT,IMPLANT    ARTHROPLASTY HAND    Date of Return to MD: TBD     Visit #: 6 / 20    FOTO 48%    Time In: 1530  Time Out: 1615  Total Billable Time: 45 minutes    Precautions:  Standard, no long finger metacarpophalangeal joint extension  4 weeks post of as of 11/23    Subjective     Pt reports: He reports continued use of orthosis.   he was compliant with home exercise program given last session.   Response to previous treatment:low pain  Functional change: increased range of motion, decreased edema, improved FOTO score    Pain: 1/10  Location: right long finger      Objective     Mental status: alert, oriented x3     Observation:   Wound edge are well approximated. Sutures came out yesterday (11/8) and steristrips in place. Mild bruising.      Wound Assessment:   Size: 6 cm  Location: right long finger metacarpophalangeal joint      Edema: Circumferential measurements: In centimters       11/9/2022 11/9/2022 11/21/22     Left Right Right   Index:          P1 7.4 7.7 7.2   Long:          P1 7.4 8.8 7.6   P2 6.6 7.4 7.1        Right  11/9 Left  11/9 Right  11/21   MPs 23.8 cm 22.5 cm 22 cm   PPC (Proximal Cobb Crease) 23.9 cm 22.7 cm 22 cm   PWC (Proximal Wrist Crease) 18.9 cm 18.8 cm 18 cm         Range of Motion:      Wrist Left  11/9 Right  11/9 Right  11/21   Extension 65 42 62    Flexion 68 30 60   Ulnar Deviation 40 20 20   Radial Deviation 20 20 20         Left Hand Finger ROM LONG  11/9  RING  11/9    MP  83 85   PIP  102 100   DIP  74 78      Right Hand Finger ROM LONG   11/9 RING  11/9 Long  11/21 Ring  11/21   MP  50 45 55 65   PIP  53 73 86 95   DIP  25 30 53 61   ARGUELLES 128 148 194 221          Strength: (SETH Dynamometer in psi.)        11/9/2022 11/9/2022     Left Right   Rung  NT*         Pinch Strength (Measured in psi)       11/9/2022 11/9/2022     Left Right   Key Pinch 22  NT *   3pt Pinch 26  NT *   2pt Pinch 12  NT *      *NT due to post surgical precautions     Treatment     Donovan received the following direct contact modalities after being cleared for contraindications for 10 minutes:  - Paraffin and moist hot pack to decrease pain and increase extensibility     Donovan received the following manual therapy techniques for 15 minutes:   -STM to scar and surrounding tissues   - intrinsic stretch digits 2-5  - PROM long finger digit flexion within pain free range   - passive range of motion long finger proximal interphalangeal joint extension with MP in protected position (flexion)    Donovan received therapeutic exercises for 20 minutes including:  - TGE's 30x  - finger spreads 30x  - pinky slides 30x  - finger opposition 30x  - small pom pom  with long finger against palm   - Wrist 3 ways over wedge holding pvc 1 min each   - joint blocking to digits 2-5  - wrist maze 2 mins  - Digit extension - all unaffected digits   - long finger radial deviation with target. Neutral to radial deviation. 3 mins  - Digit adduction index and long fingers with yellow sponge    Home Exercises and Education Provided     Education provided:   - Progress towards goals     Written Home Exercises Provided: Patient instructed to cont prior HEP.  Exercises were reviewed and Donovan was able to demonstrate them prior to the end of the session.  Donovan demonstrated good  understanding  of the HEP provided.      See EMR under Patient Instructions for exercises provided prior visit.      Assessment     Pt tolerated OT session well with no pain. Introduced new therapeutic exercises encouraging long finger radial deviation. He reports overall improved pain and long finger flexion and compliance with orthotic wear.     Donovan is progressing well towards his goals and there are no updates to goals at this time. Pt prognosis is Good.     Pt will continue to benefit from skilled outpatient occupational therapy to address the deficits listed in the problem list on initial evaluation provide pt/family education and to maximize pt's level of independence in the home and community environment.     Anticipated barriers to occupational therapy: NONE    Pt's spiritual, cultural and educational needs considered and pt agreeable to plan of care and goals.    Goals:    LTG's (8 weeks):  1)   Increase right long finger metacarpophalangeal joint flexion to 75 degrees in to increase functional hand use for work duties Ongoing   2)   Increase  strength to 50 lbs. 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 Ongoing   5)   Pt will return to near to prior level of function for ADLs and household management reporting I or Mod I with ADLs (dressing, feeding, grooming, toileting). Ongoing   6)   Demonstrate a 1.0 decrease in long finger P1 swelling Ongoing      STG's (4 weeks)  1)   Patient to be IND with HEP and modalities for pain/edema managment. Ongoing   2)   ncrease right long finger metacarpophalangeal joint flexion to 60 degrees in to increase functional hand use for work duties Ongoing   3)   Increase  strength 20 lbs. to improve functional grasp for ADLs/work/leisure activities.  Ongoing    4)   Increase right hand lateral pinch 5 psi's to increase independence with button and FM Coordination.  Ongoing   5)   Patient to be IND wiht Orthotic use, wear and care precautions. Ongoing   6)   Decrease complaints of pain to  1 out of 10 at worst to increase functional hand use for ADL/work/leisure activities. Ongoing   7)   Demonstrate a 0.5 decrease in long finger P1 swelling  Ongoing            Plan      Continue to work towards goals per protocol.     Pt to be treated by Occupational Therapy 3x times per week for 8 weeks during the certification period from 11/9/2022 to 12/28/22 to achieve the established goals.      Treatment to include: Paraffin, Fluidotherapy, Manual therapy/joint mobilizations, Modalities for pain management, US 3 mhz, Therapeutic exercises/activities., Iontophoresis with 2.0 cc Dexamethasone, Strengthening, Orthotic Fabrication/Fit/Training, Edema Control, Scar Management, Wound Care, Electrical Modalities, Joint Protection, and Energy Conservation, as well as any other treatments deemed necessary based on the patient's needs or progress.         Updates/Grading for next session: continue with current POC      Fatmata Dominique OT

## 2022-11-28 DIAGNOSIS — M19.042: ICD-10-CM

## 2022-11-28 DIAGNOSIS — M19.042 ARTHRITIS OF LEFT HAND: Primary | ICD-10-CM

## 2022-11-30 ENCOUNTER — CLINICAL SUPPORT (OUTPATIENT)
Dept: REHABILITATION | Facility: HOSPITAL | Age: 57
End: 2022-11-30
Attending: SURGERY
Payer: MEDICAID

## 2022-11-30 DIAGNOSIS — M79.89 SWELLING OF RIGHT HAND: Primary | ICD-10-CM

## 2022-11-30 DIAGNOSIS — M79.641 RIGHT HAND PAIN: ICD-10-CM

## 2022-11-30 DIAGNOSIS — M25.641 STIFFNESS OF RIGHT HAND JOINT: ICD-10-CM

## 2022-11-30 DIAGNOSIS — Z78.9 DIFFICULTY WITH ACTIVITIES OF DAILY LIVING: ICD-10-CM

## 2022-11-30 PROCEDURE — 97530 THERAPEUTIC ACTIVITIES: CPT | Mod: PN

## 2022-11-30 NOTE — PATIENT INSTRUCTIONS
"[facility], OCCUPATIONAL THERAPY  HOME EXERCISE PROGRAM     SCAR MASSAGE with dycem (little blue strip)                  AROM: Composte Extension ("Finger Lifts")  Lift all fingers together and then try to lift only long finger.        RADIAL WALK - from neutral to the thumb side       INTRINSIC STRETCH - stretch until you feel a small stretch, hold 30 seconds x 3      RELATIVE MOTION - use pink relative motion orthosis to practice bending and straightening. This will help your long finger to straighten     YELLOW PUTTY - push long finger gently into putty 1 min.  the putty 1 min.              Therapist: BARBARA Salcedo        "

## 2022-11-30 NOTE — PROGRESS NOTES
"  Occupational Therapy Daily Treatment Note     Name: Donovan Bowen  Clinic Number: [id number]    Therapy Diagnosis:   Encounter Diagnoses   Name Primary?    Swelling of right hand Yes    Right hand pain     Stiffness of right hand joint     Difficulty with activities of daily living      Physician: Sid Esquivel MD    Visit Date: 11/30/2022    Physician Orders: Eval and treat   Medical Diagnosis: Arthritis of right hand    Arthritis of right hand [M19.041]    Evaluation Date: 11/9/2022  Plan of Care Certification Date: 12/28/22  Authorization Period: 11/4/22 - 12/31/22  Surgery Date and Procedure: 10/26/22   [place] ARTHROPLASTY MC-P JT,IMPLANT    ARTHROPLASTY HAND    Date of Return to MD: TBD     Per MD order 11/28:  "Active & Passive ROM; Deep Heat Massage, Aggressive Strengthening, Ultrasound, Paraffin Progressive Resistance Exercises"    Visit #: 7 / 20    FOTO 48%    Time In: 1530  Time Out: 1615  Total Billable Time: 45 minutes    Precautions:  Standard, no long finger metacarpophalangeal joint extension  5 weeks post of as of 11/30    Subjective     Pt reports: He reports continued use of orthosis.   he was compliant with home exercise program given last session.   Response to previous treatment:low pain  Functional change: increased range of motion, decreased edema, improved FOTO score    Pain: 1/10  Location: right long finger      Objective     Mental status: alert, oriented x3     Observation:   Wound edge are well approximated. Sutures came out yesterday (11/8) and steristrips in place. Mild bruising.      Wound Assessment:   Size: 6 cm  Location: right long finger metacarpophalangeal joint      Edema: Circumferential measurements: In centimters       11/9/2022 11/9/2022 11/21/22     Left Right Right   Index:          P1 7.4 7.7 7.2   Long:          P1 7.4 8.8 7.6   P2 6.6 7.4 7.1        Right  11/9 Left  11/9 Right  11/21   MPs 23.8 cm 22.5 cm 22 cm   PPC (Proximal Cobb Crease) 23.9 cm 22.7 cm 22 cm "   PWC (Proximal Wrist Crease) 18.9 cm 18.8 cm 18 cm         Range of Motion:      Wrist Left  11/9 Right  11/9 Right  11/21   Extension 65 42 62   Flexion 68 30 60   Ulnar Deviation 40 20 20   Radial Deviation 20 20 20         Left Hand Finger ROM LONG  11/9  RING  11/9    MP  83 85   PIP  102 100   DIP  74 78      Right Hand Finger ROM LONG   11/9 RING  11/9 Long  11/21 Ring  11/21   MP  50 45 55 65   PIP  53 73 86 95   DIP  25 30 53 61   ARGUELLES 128 148 194 221          Strength: (SETH Dynamometer in psi.)        11/9/2022 11/9/2022 11/30     Left Right Right    Rung  NT* 30         Pinch Strength (Measured in psi)       11/9/2022 11/9/2022     Left Right   Key Pinch 22  NT *   3pt Pinch 26  NT *   2pt Pinch 12  NT *      *NT due to post surgical precautions     Treatment     Donovan received the following direct contact modalities after being cleared for contraindications for 10 minutes:  - Paraffin and moist hot pack to decrease pain and increase extensibility     Donovan received the following manual therapy techniques for 8 minutes:   -STM to scar and surrounding tissues   - intrinsic stretch digits 2-5    Donovan received therapeutic exercises for 27 minutes including:  - reverse joint blocking with pink soft relative orthosis  - joint blocking each interphalangeal joint long finger x 20  - radial walks long finger - neutral to radial deviation 3 mins   - active assist long finger extension 2 mins   - brown gripper 2nd rung 1 container pom pom pick ups   - TGE's 30x  - wrist 3 ways over wedge with 2 lbs  - Yellow putty: long finger presses and gripping 1 min each   - self performed massage with dycem       Home Exercises and Education Provided     Education provided:   - Issued comfort loop to prevent long finger ulnar deviation  - Progress towards goals     Written Home Exercises Provided: yes.  Exercises were reviewed and Donovan was able to demonstrate them prior to the end of the session.  Donovan  demonstrated good  understanding of the HEP provided.      See EMR under Patient Instructions for exercises provided prior visit.      Assessment     Pt tolerated OT session well with no pain. He reports his visit with Dr. Esquivel went well and he discontinued the orthosis. Per Dr. Esquivel new order, today session shifted focus on more aggressive active, active assist, and passive range of motion and progressive strengthening. Measured right hand  strength today and he demonstrates weakness compared to the unaffected side. OT issued updated home exercise program to reflect new tx activities with no precautions and pt verbalized good understanding.     Donovan is progressing well towards his goals and there are no updates to goals at this time. Pt prognosis is Good.     Pt will continue to benefit from skilled outpatient occupational therapy to address the deficits listed in the problem list on initial evaluation provide pt/family education and to maximize pt's level of independence in the home and community environment.     Anticipated barriers to occupational therapy: NONE    Pt's spiritual, cultural and educational needs considered and pt agreeable to plan of care and goals.    Goals:    LTG's (8 weeks):  1)   Increase right long finger metacarpophalangeal joint flexion to 75 degrees in to increase functional hand use for work duties Ongoing   2)   Increase  strength to 50 lbs. 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 Ongoing   5)   Pt will return to near to prior level of function for ADLs and household management reporting I or Mod I with ADLs (dressing, feeding, grooming, toileting). Ongoing   6)   Demonstrate a 1.0 decrease in long finger P1 swelling Ongoing      STG's (4 weeks)  1)    Patient to be IND with HEP and modalities for pain/edema managment. Ongoing   2)   ncrease right long finger metacarpophalangeal joint flexion to 60 degrees in to increase functional hand use for work duties Ongoing   3)   Increase  strength 20 lbs. to improve functional grasp for ADLs/work/leisure activities.  Met 11/30  4)   Increase right hand lateral pinch 5 psi's to increase independence with button and FM Coordination.  Ongoing   5)   Patient to be IND wiht Orthotic use, wear and care precautions. Met 11/30  6)   Decrease complaints of pain to  1 out of 10 at worst to increase functional hand use for ADL/work/leisure activities. Ongoing   7)   Demonstrate a 0.5 decrease in long finger P1 swelling  Ongoing            Plan      Continue to work towards goals per protocol. Re-assess objective measures.     Pt to be treated by Occupational Therapy 3x times per week for 8 weeks during the certification period from 11/9/2022 to 12/28/22 to achieve the established goals.      Treatment to include: Paraffin, Fluidotherapy, Manual therapy/joint mobilizations, Modalities for pain management, US 3 mhz, Therapeutic exercises/activities., Iontophoresis with 2.0 cc Dexamethasone, Strengthening, Orthotic Fabrication/Fit/Training, Edema Control, Scar Management, Wound Care, Electrical Modalities, Joint Protection, and Energy Conservation, as well as any other treatments deemed necessary based on the patient's needs or progress.         Updates/Grading for next session: continue with current POC      Fatmata Dominique, OT

## 2022-12-05 ENCOUNTER — CLINICAL SUPPORT (OUTPATIENT)
Dept: REHABILITATION | Facility: HOSPITAL | Age: 57
End: 2022-12-05
Payer: MEDICAID

## 2022-12-05 DIAGNOSIS — M79.89 SWELLING OF RIGHT HAND: Primary | ICD-10-CM

## 2022-12-05 DIAGNOSIS — Z78.9 DIFFICULTY WITH ACTIVITIES OF DAILY LIVING: ICD-10-CM

## 2022-12-05 DIAGNOSIS — M25.641 STIFFNESS OF RIGHT HAND JOINT: ICD-10-CM

## 2022-12-05 DIAGNOSIS — M79.641 RIGHT HAND PAIN: ICD-10-CM

## 2022-12-05 PROCEDURE — 97530 THERAPEUTIC ACTIVITIES: CPT | Mod: PN

## 2022-12-05 NOTE — PROGRESS NOTES
"  Occupational Therapy Daily Treatment Note     Name: Donovan Bowen  Clinic Number: [id number]    Therapy Diagnosis:   Encounter Diagnoses   Name Primary?    Swelling of right hand Yes    Right hand pain     Stiffness of right hand joint     Difficulty with activities of daily living        Physician: Sid Esquivel MD    Visit Date: 12/5/2022    Physician Orders: Eval and treat   Medical Diagnosis: Arthritis of right hand    Arthritis of right hand [M19.041]    Evaluation Date: 11/9/2022  Plan of Care Certification Date: 12/28/22  Authorization Period: 11/4/22 - 12/31/22  Surgery Date and Procedure: 10/26/22   [place] ARTHROPLASTY MC-P JT,IMPLANT    ARTHROPLASTY HAND    Date of Return to MD: TBD     Per MD order 11/28:  "Active & Passive ROM; Deep Heat Massage, Aggressive Strengthening, Ultrasound, Paraffin Progressive Resistance Exercises"    Visit #: 8 / 20    FOTO 48%    Time In: 1445  Time Out: 1530  Total Billable Time: 45 minutes    Precautions:  Standard, no long finger metacarpophalangeal joint extension  5 weeks post of as of 11/30    Subjective     Pt reports: He reports that he feels his swelling has gone down.  he was compliant with home exercise program given last session.   Response to previous treatment:low pain, decreased edema  Functional change: increased range of motion, decreased edema    Pain: 0/10  Location: right long finger      Objective     Mental status: alert, oriented x3     Observation:   Wound edge are well approximated. Sutures came out yesterday (11/8) and steristrips in place. Mild bruising.      Wound Assessment:   Size: 6 cm  Location: right long finger metacarpophalangeal joint      Edema: Circumferential measurements: In centimters       11/9/2022 11/9/2022 11/21/22     Left Right Right   Index:          P1 7.4 7.7 7.2   Long:          P1 7.4 8.8 7.6   P2 6.6 7.4 7.1        Right  11/9 Left  11/9 Right  11/21   MPs 23.8 cm 22.5 cm 22 cm   PPC (Proximal Cobb Crease) 23.9 cm 22.7 cm " 22 cm   PWC (Proximal Wrist Crease) 18.9 cm 18.8 cm 18 cm         Range of Motion:      Wrist Left  11/9 Right  11/9 Right  11/21   Extension 65 42 62   Flexion 68 30 60   Ulnar Deviation 40 20 20   Radial Deviation 20 20 20         Left Hand Finger ROM LONG  11/9  RING  11/9    MP  83 85   PIP  102 100   DIP  74 78      Right Hand Finger ROM LONG   11/9 RING  11/9 Long  11/21 Ring  11/21   MP  50 45 55 65   PIP  53 73 86 95   DIP  25 30 53 61   ARGUELLES 128 148 194 221          Strength: (SETH Dynamometer in psi.)        11/9/2022 11/9/2022 11/30     Left Right Right    Rung  NT* 30         Pinch Strength (Measured in psi)       11/9/2022 11/9/2022     Left Right   Key Pinch 22  NT *   3pt Pinch 26  NT *   2pt Pinch 12  NT *      *NT due to post surgical precautions     Treatment     Donovan received the following direct contact modalities after being cleared for contraindications for 10 minutes:  - Paraffin and moist hot pack to decrease pain and increase extensibility     Donovan received the following manual therapy techniques for 8 minutes:   -STM to scar and surrounding tissues   - intrinsic stretch digits 2-5    Donovan received therapeutic exercises for 27 minutes including:  - reverse joint blocking with pink soft relative orthosis  - joint blocking each interphalangeal joint long finger x 20  - radial walks long finger - neutral to radial deviation 3 mins   - active assist long finger extension 2 mins   - brown gripper 2nd rung 1 container pom pom pick ups   - TGE's 30x  - wrist 3 ways over wedge with 2 lbs  - Yellow putty: long finger presses and gripping 1 min each   - self performed massage with dycem       Home Exercises and Education Provided     Education provided:   - Issued comfort loop to prevent long finger ulnar deviation  - Progress towards goals     Written Home Exercises Provided: yes.  Exercises were reviewed and Donovan was able to demonstrate them prior to the end of the session.  Donovan  demonstrated good  understanding of the HEP provided.      See EMR under Patient Instructions for exercises provided prior visit.      Assessment     Pt tolerated OT session well with no pain. Pt says he has been compliant with his new HEP. Pt finger has increased ROM in flexion and extension. Pt continues to progress in overall strength, endurance, fine motor coordination, edema, and pain.     Donovan is progressing well towards his goals and there are no updates to goals at this time. Pt prognosis is Good.     Pt will continue to benefit from skilled outpatient occupational therapy to address the deficits listed in the problem list on initial evaluation provide pt/family education and to maximize pt's level of independence in the home and community environment.     Anticipated barriers to occupational therapy: NONE    Pt's spiritual, cultural and educational needs considered and pt agreeable to plan of care and goals.    Goals:    LTG's (8 weeks):  1)   Increase right long finger metacarpophalangeal joint flexion to 75 degrees in to increase functional hand use for work duties Ongoing   2)   Increase  strength to 50 lbs. 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 Ongoing   5)   Pt will return to near to prior level of function for ADLs and household management reporting I or Mod I with ADLs (dressing, feeding, grooming, toileting). Ongoing   6)   Demonstrate a 1.0 decrease in long finger P1 swelling Ongoing      STG's (4 weeks)  1)   Patient to be IND with HEP and modalities for pain/edema managment. Ongoing   2)   ncrease right long finger metacarpophalangeal joint flexion to 60 degrees in to increase functional hand use for work duties Ongoing   3)   Increase  strength 20 lbs. to improve  functional grasp for ADLs/work/leisure activities.  Met 11/30  4)   Increase right hand lateral pinch 5 psi's to increase independence with button and FM Coordination.  Ongoing   5)   Patient to be IND wiht Orthotic use, wear and care precautions. Met 11/30  6)   Decrease complaints of pain to  1 out of 10 at worst to increase functional hand use for ADL/work/leisure activities. Ongoing   7)   Demonstrate a 0.5 decrease in long finger P1 swelling  Ongoing            Plan      Continue to work towards goals per protocol. Re-assess objective measures.     Pt to be treated by Occupational Therapy 3x times per week for 8 weeks during the certification period from 11/9/2022 to 12/28/22 to achieve the established goals.      Treatment to include: Paraffin, Fluidotherapy, Manual therapy/joint mobilizations, Modalities for pain management, US 3 mhz, Therapeutic exercises/activities., Iontophoresis with 2.0 cc Dexamethasone, Strengthening, Orthotic Fabrication/Fit/Training, Edema Control, Scar Management, Wound Care, Electrical Modalities, Joint Protection, and Energy Conservation, as well as any other treatments deemed necessary based on the patient's needs or progress.         Updates/Grading for next session: continue with current POC      Zahira Muse, OT

## 2022-12-07 ENCOUNTER — CLINICAL SUPPORT (OUTPATIENT)
Dept: REHABILITATION | Facility: HOSPITAL | Age: 57
End: 2022-12-07
Payer: MEDICAID

## 2022-12-07 DIAGNOSIS — M79.641 RIGHT HAND PAIN: ICD-10-CM

## 2022-12-07 DIAGNOSIS — Z78.9 DIFFICULTY WITH ACTIVITIES OF DAILY LIVING: ICD-10-CM

## 2022-12-07 DIAGNOSIS — M25.641 STIFFNESS OF RIGHT HAND JOINT: ICD-10-CM

## 2022-12-07 DIAGNOSIS — M79.89 SWELLING OF RIGHT HAND: Primary | ICD-10-CM

## 2022-12-07 PROCEDURE — 97530 THERAPEUTIC ACTIVITIES: CPT | Mod: PN

## 2022-12-07 NOTE — PROGRESS NOTES
"  Occupational Therapy Daily Treatment Note     Name: Donovan Bowen  Clinic Number: [id number]    Therapy Diagnosis:   Encounter Diagnoses   Name Primary?    Swelling of right hand Yes    Right hand pain     Stiffness of right hand joint     Difficulty with activities of daily living        Physician: Sid Esquivel MD    Visit Date: 12/7/2022    Physician Orders: Eval and treat   Medical Diagnosis: Arthritis of right hand    Arthritis of right hand [M19.041]    Evaluation Date: 11/9/2022  Plan of Care Certification Date: 12/28/22  Authorization Period: 11/4/22 - 12/31/22  Surgery Date and Procedure: 10/26/22   [place] ARTHROPLASTY [facility] JT,IMPLANT    ARTHROPLASTY HAND    Date of Return to MD: TBD     Per MD order 11/28:  "Active & Passive ROM; Deep Heat Massage, Aggressive Strengthening, Ultrasound, Paraffin Progressive Resistance Exercises"    Visit #: 9 / 20    FOTO 48%    Time In: 1530  Time Out: 1615  Total Billable Time: 45 minutes    Precautions:  Standard, no long finger metacarpophalangeal joint extension  6 weeks post of as of 12/7    Subjective     Pt reports: He reports that he feels his swelling has gone down.  he was compliant with home exercise program given last session.   Response to previous treatment:low pain, decreased edema  Functional change: increased range of motion, decreased edema    Pain: 0/10  Location: right long finger      Objective     Mental status: alert, oriented x3     Observation:   Wound edge are well approximated. Sutures came out yesterday (11/8) and steristrips in place. Mild bruising.      Wound Assessment:   Size: 6 cm  Location: right long finger metacarpophalangeal joint      Edema: Circumferential measurements: In centimters       11/9/2022 11/9/2022 11/21/22     Left Right Right   Index:          P1 7.4 7.7 7.2   Long:          P1 7.4 8.8 7.6   P2 6.6 7.4 7.1        Right  11/9 Left  11/9 Right  11/21   MPs 23.8 cm 22.5 cm 22 cm   PPC (Proximal Cobb Crease) 23.9 cm 22.7 cm " 22 cm   PWC (Proximal Wrist Crease) 18.9 cm 18.8 cm 18 cm         Range of Motion:      Wrist Left  11/9 Right  11/9 Right  11/21   Extension 65 42 62   Flexion 68 30 60   Ulnar Deviation 40 20 20   Radial Deviation 20 20 20         Left Hand Finger ROM LONG  11/9  RING  11/9    MP  83 85   PIP  102 100   DIP  74 78      Right Hand Finger ROM LONG   11/9 RING  11/9 Long  11/21 Ring  11/21   MP  50 45 55 65   PIP  53 73 86 95   DIP  25 30 53 61   ARGUELLES 128 148 194 221          Strength: (SETH Dynamometer in psi.)        11/9/2022 11/9/2022 11/30     Left Right Right    Rung  NT* 30         Pinch Strength (Measured in psi)       11/9/2022 11/9/2022     Left Right   Key Pinch 22  NT *   3pt Pinch 26  NT *   2pt Pinch 12  NT *      *NT due to post surgical precautions     Treatment     Donovan received the following direct contact modalities after being cleared for contraindications for 10 minutes:  - Paraffin and moist hot pack to decrease pain and increase extensibility     Donovan received the following manual therapy techniques for 8 minutes:   -STM to scar and surrounding tissues   - intrinsic stretch digits 2-5    Donovan received therapeutic exercises for 27 minutes including:  - reverse joint blocking with pink soft relative orthosis  - joint blocking each interphalangeal joint long finger x 20  - radial walks long finger - neutral to radial deviation 3 mins   - active assist long finger extension 2 mins   - active long finger extension 2 mins  - brown gripper 2nd rung 1 container pom pom pick ups   - TGE's 30x  - wrist 3 ways over wedge with 2 lbs  - Yellow putty: long finger presses and gripping 1 min each   - self performed massage with dycem   - Yellow clip - pinching with long finger and thumb 1 container pom pom pick ups      Home Exercises and Education Provided     Education provided:   - Issued comfort loop to prevent long finger ulnar deviation  - Progress towards goals     Written Home Exercises  Provided: yes.  Exercises were reviewed and Donovan was able to demonstrate them prior to the end of the session.  Donovan demonstrated good  understanding of the HEP provided.      See EMR under Patient Instructions for exercises provided prior visit.      Assessment     Pt tolerated OT session well with upgraded exercises. He is tolerating more aggressive manual therapies well. He is progressing well for 6 weeks post-op.     Donovan is progressing well towards his goals and there are no updates to goals at this time. Pt prognosis is Good.     Pt will continue to benefit from skilled outpatient occupational therapy to address the deficits listed in the problem list on initial evaluation provide pt/family education and to maximize pt's level of independence in the home and community environment.     Anticipated barriers to occupational therapy: NONE    Pt's spiritual, cultural and educational needs considered and pt agreeable to plan of care and goals.    Goals:    LTG's (8 weeks):  1)   Increase right long finger metacarpophalangeal joint flexion to 75 degrees in to increase functional hand use for work duties Ongoing   2)   Increase  strength to 50 lbs. 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 Ongoing   5)   Pt will return to near to prior level of function for ADLs and household management reporting I or Mod I with ADLs (dressing, feeding, grooming, toileting). Ongoing   6)   Demonstrate a 1.0 decrease in long finger P1 swelling Ongoing      STG's (4 weeks)  1)   Patient to be IND with HEP and modalities for pain/edema managment.  Met 12/7  2)   ncrease right long finger metacarpophalangeal joint flexion to 60 degrees in to increase functional hand use for work duties Ongoing   3)   Increase   strength 20 lbs. to improve functional grasp for ADLs/work/leisure activities.  Met 11/30  4)   Increase right hand lateral pinch 5 psi's to increase independence with button and FM Coordination.  Ongoing   5)   Patient to be IND wiht Orthotic use, wear and care precautions. Met 11/30  6)   Decrease complaints of pain to  1 out of 10 at worst to increase functional hand use for ADL/work/leisure activities. Ongoing   7)   Demonstrate a 0.5 decrease in long finger P1 swelling  Ongoing            Plan      Continue to work towards goals per protocol.     Pt to be treated by Occupational Therapy 3x times per week for 8 weeks during the certification period from 11/9/2022 to 12/28/22 to achieve the established goals.      Treatment to include: Paraffin, Fluidotherapy, Manual therapy/joint mobilizations, Modalities for pain management, US 3 mhz, Therapeutic exercises/activities., Iontophoresis with 2.0 cc Dexamethasone, Strengthening, Orthotic Fabrication/Fit/Training, Edema Control, Scar Management, Wound Care, Electrical Modalities, Joint Protection, and Energy Conservation, as well as any other treatments deemed necessary based on the patient's needs or progress.         Updates/Grading for next session: continue with current POC      Fatmata Dominique, OT

## 2022-12-12 ENCOUNTER — CLINICAL SUPPORT (OUTPATIENT)
Dept: REHABILITATION | Facility: HOSPITAL | Age: 57
End: 2022-12-12
Payer: MEDICAID

## 2022-12-12 DIAGNOSIS — Z78.9 DIFFICULTY WITH ACTIVITIES OF DAILY LIVING: ICD-10-CM

## 2022-12-12 DIAGNOSIS — M25.641 STIFFNESS OF RIGHT HAND JOINT: ICD-10-CM

## 2022-12-12 DIAGNOSIS — M79.641 RIGHT HAND PAIN: ICD-10-CM

## 2022-12-12 DIAGNOSIS — M79.89 SWELLING OF RIGHT HAND: Primary | ICD-10-CM

## 2022-12-12 PROCEDURE — 97110 THERAPEUTIC EXERCISES: CPT | Mod: PN

## 2022-12-12 NOTE — PROGRESS NOTES
"  Occupational Therapy Daily Treatment Note     Name: Donovan Bowen  Clinic Number: [id number]    Therapy Diagnosis:   Encounter Diagnoses   Name Primary?    Swelling of right hand Yes    Right hand pain     Stiffness of right hand joint     Difficulty with activities of daily living        Physician: Sid Esquivel MD    Visit Date: 12/12/2022    Physician Orders: Eval and treat   Medical Diagnosis: Arthritis of right hand    Arthritis of right hand [M19.041]    Evaluation Date: 11/9/2022  Plan of Care Certification Date: 12/28/22  Authorization Period: 11/4/22 - 12/31/22  Surgery Date and Procedure: 10/26/22   [place] ARTHROPLASTY [facility]-P JT,IMPLANT    ARTHROPLASTY HAND    Date of Return to MD: TBD     Per MD order 11/28:  "Active & Passive ROM; Deep Heat Massage, Aggressive Strengthening, Ultrasound, Paraffin Progressive Resistance Exercises"    Visit #: 10 / 20    FOTO 48%    Time In: 1530  Time Out: 1615  Total Billable Time: 45 minutes    Precautions:  Standard, no long finger metacarpophalangeal joint extension  6 weeks post of as of 12/7    Subjective     Pt reports: He reports he "worked it hard" on Saturday followed by soreness.   he was compliant with home exercise program given last session.   Response to previous treatment:low pain, decreased edema  Functional change: increased range of motion, decreased edema    Pain: 0/10  Location: right long finger      Objective     Mental status: alert, oriented x3      Wound Assessment:   Size: 6 cm  Location: right long finger metacarpophalangeal joint      Edema: Circumferential measurements: In centimters       11/9/2022 11/9/2022 11/21/22     Left Right Right   Index:          P1 7.4 7.7 7.2   Long:          P1 7.4 8.8 7.6   P2 6.6 7.4 7.1        Right  11/9 Left  11/9 Right  11/21   MPs 23.8 cm 22.5 cm 22 cm   PPC (Proximal Cobb Crease) 23.9 cm 22.7 cm 22 cm   PWC (Proximal Wrist Crease) 18.9 cm 18.8 cm 18 cm         Range of Motion:      Wrist Left  11/9 " Right  11/9 Right  11/21   Extension 65 42 62   Flexion 68 30 60   Ulnar Deviation 40 20 20   Radial Deviation 20 20 20         Left Hand Finger ROM LONG  11/9  RING  11/9    MP  83 85   PIP  102 100   DIP  74 78      Right Hand Finger ROM LONG   11/9 RING  11/9 Long  11/21 Ring  11/21   MP  50 45 55 65   PIP  53 73 86 95   DIP  25 30 53 61   ARGUELLES 128 148 194 221          Strength: (SETH Dynamometer in psi.)        11/9/2022 11/9/2022 11/30     Left Right Right    Rung  NT* 30         Pinch Strength (Measured in psi)       11/9/2022 11/9/2022     Left Right   Key Pinch 22  NT *   3pt Pinch 26  NT *   2pt Pinch 12  NT *      *NT due to post surgical precautions     Treatment     Donovan received the following direct contact modalities after being cleared for contraindications for 10 minutes:  - Paraffin and moist hot pack to decrease pain and increase extensibility     Donovan received the following manual therapy techniques for 8 minutes:   -STM to scar and surrounding tissues   - Scar massage with dycem  - intrinsic stretch digits 2-5  - metacarpophalangeal joint stretch digits 2-5 held 30 seconds x 3    Donovan received therapeutic exercises for 27 minutes including:  - reverse joint blocking with pink soft relative orthosis  - joint blocking each interphalangeal joint long finger x 20  - radial walks long finger - neutral to radial deviation 3 mins   - active assist long finger extension 2 mins   - active long finger extension 2 mins  - brown gripper 2nd rung 1 container pom pom pick ups   - TGE's 30x  - wrist 3 ways over wedge with 3 lbs  - Red clip - pinching with long finger and thumb 1 container pom pom pick ups      Home Exercises and Education Provided     Education provided:   - Progress towards goals     Written Home Exercises Provided: yes.  Exercises were reviewed and Donovan was able to demonstrate them prior to the end of the session.  Donovan demonstrated good  understanding of the HEP provided.       See EMR under Patient Instructions for exercises provided prior visit.      Assessment     Pt tolerated OT session well with upgraded exercises. Upgraded weight strengthening to 3 lbs and pinch strengthening to red clip. He's been compliant with comfort tape use and he demonstrates decreased ulnar deviation of the long finger. He is tolerating more aggressive manual (passive range of motion) therapies well. He is progressing well for 6 weeks post-op.     Donovan is progressing well towards his goals and there are no updates to goals at this time. Pt prognosis is Good.     Pt will continue to benefit from skilled outpatient occupational therapy to address the deficits listed in the problem list on initial evaluation provide pt/family education and to maximize pt's level of independence in the home and community environment.     Anticipated barriers to occupational therapy: NONE    Pt's spiritual, cultural and educational needs considered and pt agreeable to plan of care and goals.    Goals:    LTG's (8 weeks):  1)   Increase right long finger metacarpophalangeal joint flexion to 75 degrees in to increase functional hand use for work duties Ongoing   2)   Increase  strength to 50 lbs. 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 Ongoing   5)   Pt will return to near to prior level of function for ADLs and household management reporting I or Mod I with ADLs (dressing, feeding, grooming, toileting). Ongoing   6)   Demonstrate a 1.0 decrease in long finger P1 swelling Ongoing      STG's (4 weeks)  1)   Patient to be IND with HEP and modalities for pain/edema managment.  Met 12/7  2)   ncrease right long finger metacarpophalangeal joint flexion to 60 degrees in to increase functional hand use for work  duties Ongoing   3)   Increase  strength 20 lbs. to improve functional grasp for ADLs/work/leisure activities.  Met 11/30  4)   Increase right hand lateral pinch 5 psi's to increase independence with button and FM Coordination.  Ongoing   5)   Patient to be IND wiht Orthotic use, wear and care precautions. Met 11/30  6)   Decrease complaints of pain to  1 out of 10 at worst to increase functional hand use for ADL/work/leisure activities. Ongoing   7)   Demonstrate a 0.5 decrease in long finger P1 swelling  Ongoing            Plan      Continue to work towards goals per protocol. Re-assess objective measures.     Pt to be treated by Occupational Therapy 3x times per week for 8 weeks during the certification period from 11/9/2022 to 12/28/22 to achieve the established goals.      Treatment to include: Paraffin, Fluidotherapy, Manual therapy/joint mobilizations, Modalities for pain management, US 3 mhz, Therapeutic exercises/activities., Iontophoresis with 2.0 cc Dexamethasone, Strengthening, Orthotic Fabrication/Fit/Training, Edema Control, Scar Management, Wound Care, Electrical Modalities, Joint Protection, and Energy Conservation, as well as any other treatments deemed necessary based on the patient's needs or progress.         Updates/Grading for next session: continue with current POC      Fatmata Dominique, OT

## 2022-12-14 ENCOUNTER — TELEPHONE (OUTPATIENT)
Dept: REHABILITATION | Facility: HOSPITAL | Age: 57
End: 2022-12-14
Payer: MEDICAID

## 2022-12-14 ENCOUNTER — CLINICAL SUPPORT (OUTPATIENT)
Dept: REHABILITATION | Facility: HOSPITAL | Age: 57
End: 2022-12-14
Payer: MEDICAID

## 2022-12-14 DIAGNOSIS — M79.641 RIGHT HAND PAIN: ICD-10-CM

## 2022-12-14 DIAGNOSIS — Z78.9 DIFFICULTY WITH ACTIVITIES OF DAILY LIVING: ICD-10-CM

## 2022-12-14 DIAGNOSIS — M79.89 SWELLING OF RIGHT HAND: Primary | ICD-10-CM

## 2022-12-14 DIAGNOSIS — M25.641 STIFFNESS OF RIGHT HAND JOINT: ICD-10-CM

## 2022-12-14 PROCEDURE — 97530 THERAPEUTIC ACTIVITIES: CPT | Mod: PN

## 2022-12-14 NOTE — PROGRESS NOTES
"  Occupational Therapy Daily Treatment Note     Name: Donovan Bowen  Clinic Number: [id number]    Therapy Diagnosis:   Encounter Diagnoses   Name Primary?    Swelling of right hand Yes    Right hand pain     Stiffness of right hand joint     Difficulty with activities of daily living        Physician: Sid Esquivel MD    Visit Date: 12/14/2022    Physician Orders: Eval and treat   Medical Diagnosis: Arthritis of right hand    Arthritis of right hand [M19.041]    Evaluation Date: 11/9/2022  Plan of Care Certification Date: 12/28/22  Authorization Period: 11/4/22 - 12/31/22  Surgery Date and Procedure: 10/26/22   [place] ARTHROPLASTY [facility]-P JT,IMPLANT    ARTHROPLASTY HAND    Date of Return to MD: TBD     Per MD order 11/28:  "Active & Passive ROM; Deep Heat Massage, Aggressive Strengthening, Ultrasound, Paraffin Progressive Resistance Exercises"    Visit #: 11 / 20    FOTO 48%    Time In: 1530  Time Out: 1615  Total Billable Time: 45 minutes    Precautions:  Standard, no long finger metacarpophalangeal joint extension  7 weeks post of as of 12/14    Subjective     Pt reports: He reports no significant changes with minimal soreness that comes and goes.   he was compliant with home exercise program given last session.   Response to previous treatment:low pain, decreased edema  Functional change: increased range of motion, decreased edema    Pain: 0/10  Location: right long finger      Objective     Mental status: alert, oriented x3      Wound Assessment:   Size: 6 cm  Location: right long finger metacarpophalangeal joint      Edema: Circumferential measurements: In centimters       11/9/2022 11/9/2022 11/21/22     Left Right Right   Index:          P1 7.4 7.7 7.2   Long:          P1 7.4 8.8 7.6   P2 6.6 7.4 7.1        Right  11/9 Left  11/9 Right  11/21   MPs 23.8 cm 22.5 cm 22 cm   PPC (Proximal Cobb Crease) 23.9 cm 22.7 cm 22 cm   PWC (Proximal Wrist Crease) 18.9 cm 18.8 cm 18 cm         Range of Motion:      Wrist " Left  11/9 Right  11/9 Right  11/21   Extension 65 42 62   Flexion 68 30 60   Ulnar Deviation 40 20 20   Radial Deviation 20 20 20         Left Hand Finger ROM LONG  11/9  RING  11/9    MP  83 85   PIP  102 100   DIP  74 78      Right Hand Finger ROM LONG   11/9 RING  11/9 Long  11/21 Ring  11/21   MP  50 45 55 65   PIP  53 73 86 95   DIP  25 30 53 61   ARGUELLES 128 148 194 221          Strength: (SETH Dynamometer in psi.)        11/9/2022 11/9/2022 11/30     Left Right Right    Rung  NT* 30         Pinch Strength (Measured in psi)       11/9/2022 11/9/2022     Left Right   Key Pinch 22  NT *   3pt Pinch 26  NT *   2pt Pinch 12  NT *      *NT due to post surgical precautions     Treatment     Donovan received the following direct contact modalities after being cleared for contraindications for 10 minutes:  - Paraffin and moist hot pack to decrease pain and increase extensibility     Donovan received the following manual therapy techniques for 8 minutes:   -STM to scar and surrounding tissues   - Scar massage with dycem  - intrinsic stretch digits 2-5  - metacarpophalangeal joint stretch digits 2-5 held 30 seconds x 3    Donovan received therapeutic exercises for 27 minutes including:  - reverse joint blocking with pink soft relative orthosis  - joint blocking each interphalangeal joint long finger x 20  - radial walks long finger - neutral to radial deviation 3 mins   - active assist long finger extension 2 mins   - active long finger extension 2 mins  - brown gripper 2nd rung 1 container pom pom pick ups   - TGE's 30x  - wrist 3 ways over wedge with 3 lbs  - Red clip - pinching with long finger and thumb 1 container pom pom pick ups      Home Exercises and Education Provided     Education provided:   - Progress towards goals     Written Home Exercises Provided: yes.  Exercises were reviewed and Donovan was able to demonstrate them prior to the end of the session.  Donovan demonstrated good  understanding of the  HEP provided.      See EMR under Patient Instructions for exercises provided prior visit.      Assessment     Pt tolerated OT session well with upgraded exercises. He reports he is making good improvements with improved functional use of right hand. He continues to report strict compliance with home exercise program and he is tolerating progressive strengthening well.      Donovan is progressing well towards his goals and there are no updates to goals at this time. Pt prognosis is Good.     Pt will continue to benefit from skilled outpatient occupational therapy to address the deficits listed in the problem list on initial evaluation provide pt/family education and to maximize pt's level of independence in the home and community environment.     Anticipated barriers to occupational therapy: NONE    Pt's spiritual, cultural and educational needs considered and pt agreeable to plan of care and goals.    Goals:    LTG's (8 weeks):  1)   Increase right long finger metacarpophalangeal joint flexion to 75 degrees in to increase functional hand use for work duties Ongoing   2)   Increase  strength to 50 lbs. 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 Ongoing   5)   Pt will return to near to prior level of function for ADLs and household management reporting I or Mod I with ADLs (dressing, feeding, grooming, toileting). Ongoing   6)   Demonstrate a 1.0 decrease in long finger P1 swelling Ongoing      STG's (4 weeks)  1)   Patient to be IND with HEP and modalities for pain/edema managment.  Met 12/7  2)   ncrease right long finger metacarpophalangeal joint flexion to 60 degrees in to increase functional hand use for work duties Ongoing   3)   Increase  strength 20 lbs. to improve functional grasp for  ADLs/work/leisure activities.  Met 11/30  4)   Increase right hand lateral pinch 5 psi's to increase independence with button and FM Coordination.  Ongoing   5)   Patient to be IND wiht Orthotic use, wear and care precautions. Met 11/30  6)   Decrease complaints of pain to  1 out of 10 at worst to increase functional hand use for ADL/work/leisure activities. Ongoing   7)   Demonstrate a 0.5 decrease in long finger P1 swelling  Ongoing            Plan      Continue to work towards goals per protocol. Re-assess objective measures.     Pt to be treated by Occupational Therapy 3x times per week for 8 weeks during the certification period from 11/9/2022 to 12/28/22 to achieve the established goals.      Treatment to include: Paraffin, Fluidotherapy, Manual therapy/joint mobilizations, Modalities for pain management, US 3 mhz, Therapeutic exercises/activities., Iontophoresis with 2.0 cc Dexamethasone, Strengthening, Orthotic Fabrication/Fit/Training, Edema Control, Scar Management, Wound Care, Electrical Modalities, Joint Protection, and Energy Conservation, as well as any other treatments deemed necessary based on the patient's needs or progress.         Updates/Grading for next session: continue with current POC      Fatmata Dominique, OT

## 2022-12-19 ENCOUNTER — CLINICAL SUPPORT (OUTPATIENT)
Dept: REHABILITATION | Facility: HOSPITAL | Age: 57
End: 2022-12-19
Payer: MEDICAID

## 2022-12-19 DIAGNOSIS — M79.89 SWELLING OF RIGHT HAND: Primary | ICD-10-CM

## 2022-12-19 DIAGNOSIS — M79.641 RIGHT HAND PAIN: ICD-10-CM

## 2022-12-19 DIAGNOSIS — M25.641 STIFFNESS OF RIGHT HAND JOINT: ICD-10-CM

## 2022-12-19 DIAGNOSIS — Z78.9 DIFFICULTY WITH ACTIVITIES OF DAILY LIVING: ICD-10-CM

## 2022-12-19 PROCEDURE — 97530 THERAPEUTIC ACTIVITIES: CPT | Mod: PN

## 2022-12-19 NOTE — PROGRESS NOTES
"  Occupational Therapy Daily Treatment Note     Name: Donovan Bowen  Clinic Number: [id number]    Therapy Diagnosis:   Encounter Diagnoses   Name Primary?    Swelling of right hand Yes    Right hand pain     Stiffness of right hand joint     Difficulty with activities of daily living          Physician: Sid Esquivel MD    Visit Date: 12/19/2022    Physician Orders: Eval and treat   Medical Diagnosis: Arthritis of right hand    Arthritis of right hand [M19.041]    Evaluation Date: 11/9/2022  Plan of Care Certification Date: 12/28/22  Authorization Period: 11/4/22 - 12/31/22  Surgery Date and Procedure: 10/26/22   AR ARTHROPLASTY MC-P JT,IMPLANT    ARTHROPLASTY HAND    Date of Return to MD: TBD     Per MD order 11/28:  "Active & Passive ROM; Deep Heat Massage, Aggressive Strengthening, Ultrasound, Paraffin Progressive Resistance Exercises"    Visit #: 12 / 12  FOTO 48%    Time In: 1435  Time Out: 1520  Total Billable Time: 45 minutes    Precautions:  Standard, no long finger metacarpophalangeal joint extension  7 weeks post of as of 12/14    Subjective     Pt reports: He reports he can flex more at the MP joint.  he was compliant with home exercise program given last session.   Response to previous treatment: increased range of motion  Functional change: increased range of motion    Pain: 0/10  Location: right long finger      Objective     Mental status: alert, oriented x3      Wound Assessment:   Size: 6 cm  Location: right long finger metacarpophalangeal joint      Edema: Circumferential measurements: In centimters       11/9/2022 11/9/2022 11/21/22     Left Right Right   Index:          P1 7.4 7.7 7.2   Long:          P1 7.4 8.8 7.6   P2 6.6 7.4 7.1        Right  11/9 Left  11/9 Right  11/21   MPs 23.8 cm 22.5 cm 22 cm   PPC (Proximal Cobb Crease) 23.9 cm 22.7 cm 22 cm   PWC (Proximal Wrist Crease) 18.9 cm 18.8 cm 18 cm         Range of Motion:      Wrist Left  11/9 Right  11/9 Right  11/21   Extension 65 42 " 62   Flexion 68 30 60   Ulnar Deviation 40 20 20   Radial Deviation 20 20 20         Left Hand Finger ROM LONG  11/9  RING  11/9    MP  83 85   PIP  102 100   DIP  74 78      Right Hand Finger ROM LONG   11/9 RING  11/9 Long  11/21 Ring  11/21   MP  50 45 55 65   PIP  53 73 86 95   DIP  25 30 53 61   ARGUELLES 128 148 194 221          Strength: (SETH Dynamometer in psi.)        11/9/2022 11/9/2022 11/30     Left Right Right    Rung  NT* 30         Pinch Strength (Measured in psi)       11/9/2022 11/9/2022     Left Right   Key Pinch 22  NT *   3pt Pinch 26  NT *   2pt Pinch 12  NT *      *NT due to post surgical precautions     Treatment     Donovan received the following direct contact modalities after being cleared for contraindications for 10 minutes:  - Paraffin and moist hot pack to decrease pain and increase extensibility     Donovan received the following manual therapy techniques for 12 minutes:   -STM to scar and surrounding tissues   - Scar massage with dycem  - intrinsic stretch digits 2-5  - metacarpophalangeal joint stretch digits 2-5 held 30 seconds x 3    Donovan received therapeutic exercises for 23 minutes including:  - joint blocking each interphalangeal joint long finger x 20  - radial walks long finger - neutral to radial deviation 3 mins   - active assist long finger extension 2 mins   - active long finger extension 2 mins  - brown gripper 2nd rung 1 container pom pom pick ups   - TGE's 30x  - Red clip - pinching with long finger and thumb 1 container pom pom pick ups      Home Exercises and Education Provided     Education provided:   - Progress towards goals     Written Home Exercises Provided: yes.  Exercises were reviewed and Donovan was able to demonstrate them prior to the end of the session.  Donovan demonstrated good  understanding of the HEP provided.      See EMR under Patient Instructions for exercises provided prior visit.      Assessment     Pt tolerated OT session well with upgraded  exercises. Pt states that he was compliant with orthosis wear and stretching exercises over the weekend. Pt has increased his MP ROM. Pt still has slight edema in long finger hindering ROM. Pt progressing well with increased resistance exercises.    Donovan is progressing well towards his goals and there are no updates to goals at this time. Pt prognosis is Good.     Pt will continue to benefit from skilled outpatient occupational therapy to address the deficits listed in the problem list on initial evaluation provide pt/family education and to maximize pt's level of independence in the home and community environment.     Anticipated barriers to occupational therapy: NONE    Pt's spiritual, cultural and educational needs considered and pt agreeable to plan of care and goals.    Goals:    LTG's (8 weeks):  1)   Increase right long finger metacarpophalangeal joint flexion to 75 degrees in to increase functional hand use for work duties Ongoing   2)   Increase  strength to 50 lbs. 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 Ongoing   5)   Pt will return to near to prior level of function for ADLs and household management reporting I or Mod I with ADLs (dressing, feeding, grooming, toileting). Ongoing   6)   Demonstrate a 1.0 decrease in long finger P1 swelling Ongoing      STG's (4 weeks)  1)   Patient to be IND with HEP and modalities for pain/edema managment.  Met 12/7  2)   ncrease right long finger metacarpophalangeal joint flexion to 60 degrees in to increase functional hand use for work duties Ongoing   3)   Increase  strength 20 lbs. to improve functional grasp for ADLs/work/leisure activities.  Met 11/30  4)   Increase right hand lateral pinch 5 psi's to increase independence with button  and FM Coordination.  Ongoing   5)   Patient to be IND wiht Orthotic use, wear and care precautions. Met 11/30  6)   Decrease complaints of pain to  1 out of 10 at worst to increase functional hand use for ADL/work/leisure activities. Ongoing   7)   Demonstrate a 0.5 decrease in long finger P1 swelling  Ongoing            Plan      Continue to work towards goals per protocol. Re-assess objective measures.     Pt to be treated by Occupational Therapy 3x times per week for 8 weeks during the certification period from 11/9/2022 to 12/28/22 to achieve the established goals.      Treatment to include: Paraffin, Fluidotherapy, Manual therapy/joint mobilizations, Modalities for pain management, US 3 mhz, Therapeutic exercises/activities., Iontophoresis with 2.0 cc Dexamethasone, Strengthening, Orthotic Fabrication/Fit/Training, Edema Control, Scar Management, Wound Care, Electrical Modalities, Joint Protection, and Energy Conservation, as well as any other treatments deemed necessary based on the patient's needs or progress.         Updates/Grading for next session: continue with current POC      Zahira Muse, OT

## 2022-12-21 ENCOUNTER — CLINICAL SUPPORT (OUTPATIENT)
Dept: REHABILITATION | Facility: HOSPITAL | Age: 57
End: 2022-12-21
Payer: MEDICAID

## 2022-12-21 DIAGNOSIS — M25.641 STIFFNESS OF RIGHT HAND JOINT: ICD-10-CM

## 2022-12-21 DIAGNOSIS — Z78.9 DIFFICULTY WITH ACTIVITIES OF DAILY LIVING: ICD-10-CM

## 2022-12-21 DIAGNOSIS — M79.641 RIGHT HAND PAIN: ICD-10-CM

## 2022-12-21 DIAGNOSIS — M79.89 SWELLING OF RIGHT HAND: Primary | ICD-10-CM

## 2022-12-21 PROCEDURE — 97530 THERAPEUTIC ACTIVITIES: CPT | Mod: PN

## 2022-12-21 NOTE — PROGRESS NOTES
"  Occupational Therapy Daily Treatment Note     Name: Donovan Bowen  Clinic Number: [id number]    Therapy Diagnosis:   Encounter Diagnoses   Name Primary?    Swelling of right hand Yes    Right hand pain     Stiffness of right hand joint     Difficulty with activities of daily living        Physician: Sid Esquivel MD    Visit Date: 12/21/2022    Physician Orders: Eval and treat   Medical Diagnosis: Arthritis of right hand    Arthritis of right hand [M19.041]    Evaluation Date: 11/9/2022  Plan of Care Certification Date: 12/28/22  Authorization Period: 11/4/22 - 12/31/22  Surgery Date and Procedure: 10/26/22   [place] ARTHROPLASTY MC-P JT,IMPLANT    ARTHROPLASTY HAND    Date of Return to MD: TBD     Per MD order 11/28:  "Active & Passive ROM; Deep Heat Massage, Aggressive Strengthening, Ultrasound, Paraffin Progressive Resistance Exercises"    Visit #: 13 / 12  FOTO 48%    Time In: 1530  Time Out: 1615  Total Billable Time: 45 minutes    Precautions:  Standard,  8 weeks post of as of 12/14    Subjective     Pt reports: He reports he's been using the dynamic flexion splint. He reports stiffness with the colder weather   he was compliant with home exercise program given last session.   Response to previous treatment: increased range of motion  Functional change: increased range of motion    Pain: 1/10  Location: right long finger      Objective     Mental status: alert, oriented x3      Wound Assessment:   Size: 6 cm  Location: right long finger metacarpophalangeal joint      Edema: Circumferential measurements: In centimters       11/9/2022 11/9/2022 11/21/22     Left Right Right   Index:          P1 7.4 7.7 7.2   Long:          P1 7.4 8.8 7.6   P2 6.6 7.4 7.1        Right  11/9 Left  11/9 Right  11/21   MPs 23.8 cm 22.5 cm 22 cm   PPC (Proximal Cobb Crease) 23.9 cm 22.7 cm 22 cm   PWC (Proximal Wrist Crease) 18.9 cm 18.8 cm 18 cm         Range of Motion:      Wrist Left  11/9 Right  11/9 Right  11/21   Extension 65 " 42 62   Flexion 68 30 60   Ulnar Deviation 40 20 20   Radial Deviation 20 20 20         Left Hand Finger ROM LONG  11/9  RING  11/9    MP  83 85   PIP  102 100   DIP  74 78      Right Hand Finger ROM LONG   11/9 RING  11/9 Long  11/21 Long  11/21 Long  12/21   MP  50 45 55 65 68   PIP  53 73 86 95 97   DIP  25 30 53 61 60   ARGUELLES 128 148 194 221 225          Strength: (SETH Dynamometer in psi.)        11/9/2022 11/9/2022 11/30 12/21     Left Right Right  Right    Rung  NT* 30 50         Pinch Strength (Measured in psi)       11/9/2022 11/9/2022 12/21/22     Left Right Right    Key Pinch 22  NT *    3pt Pinch 26  NT *    2pt Pinch    *thumb to middle finger  12  NT * 6      *NT due to post surgical precautions     Treatment     Donovan received the following direct contact modalities after being cleared for contraindications for 10 minutes:  - fluidotherapy to decrease pain and increase extensibility     Donovan received the following manual therapy techniques for 12 minutes:   -STM to scar and surrounding tissues   - Scar massage with dycem  - intrinsic stretch digits 2-5  - metacarpophalangeal joint stretch digits 2-5 held 30 seconds x 3    Donovan received therapeutic exercises for 23 minutes including:  - joint blocking each interphalangeal joint long finger x 20  - radial walks long finger - neutral to radial deviation 3 mins   - reverse joint blocking to long finger proximal interphalangeal joint with soft flexi kahlil 1 min   - purple ball with yellow digit extension band - 1 min   - middle and index stump squeezes with green sponge (adduction) 1 min  - active assist long finger extension 2 mins   - active long finger extension 2 mins  - brown gripper: 1/2 3rd rung, 1/2 2nd rung - 1 container pom pom pick ups   - TGE's 30x  - tweezer dexterity board - all pins in and out   - yellow clip - pinching with long finger and thumb 1 container pom pom pick ups      Home Exercises and Education Provided      Education provided:   - Progress towards goals     Written Home Exercises Provided: yes.  Exercises were reviewed and Donovan was able to demonstrate them prior to the end of the session.  Donovan demonstrated good  understanding of the HEP provided.      See EMR under Patient Instructions for exercises provided prior visit.      Assessment     Pt tolerated OT session well. Scar site is maturing well, and he reports he's been compliant with using the dynamic flexion splint to increase metacarpophalangeal joint flexion. Re-assessed objective measures and he demonstrates increased right  strength and total active movement of the long finger. Introduced new fine motor coordination exercises and he completed without difficulty.     Donovan is progressing well towards his goals and there are no updates to goals at this time. Pt prognosis is Good.     Pt will continue to benefit from skilled outpatient occupational therapy to address the deficits listed in the problem list on initial evaluation provide pt/family education and to maximize pt's level of independence in the home and community environment.     Anticipated barriers to occupational therapy: NONE    Pt's spiritual, cultural and educational needs considered and pt agreeable to plan of care and goals.    Goals:    LTG's (8 weeks):  1)   Increase right long finger metacarpophalangeal joint flexion to 75 degrees in to increase functional hand use for work duties Ongoing   2)   Increase  strength to 50 lbs. 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 Ongoing   5)   Pt will return to near to prior level of function for ADLs and household management reporting I or Mod I with ADLs (dressing, feeding, grooming, toileting). Ongoing   6)    Demonstrate a 1.0 decrease in long finger P1 swelling Ongoing      STG's (4 weeks)  1)   Patient to be IND with HEP and modalities for pain/edema managment.  Met 12/7  2)   ncrease right long finger metacarpophalangeal joint flexion to 60 degrees in to increase functional hand use for work duties Met 12/21   3)   Increase  strength 20 lbs. to improve functional grasp for ADLs/work/leisure activities.  Met 11/30  4)   Increase right hand lateral pinch 5 psi's to increase independence with button and FM Coordination.  Ongoing   5)   Patient to be IND wiht Orthotic use, wear and care precautions. Met 11/30  6)   Decrease complaints of pain to  1 out of 10 at worst to increase functional hand use for ADL/work/leisure activities. Ongoing   7)   Demonstrate a 0.5 decrease in long finger P1 swelling  Ongoing            Plan      Continue to work towards goals per protocol. Re-assess objective measures.     Pt to be treated by Occupational Therapy 3x times per week for 8 weeks during the certification period from 11/9/2022 to 12/28/22 to achieve the established goals.      Treatment to include: Paraffin, Fluidotherapy, Manual therapy/joint mobilizations, Modalities for pain management, US 3 mhz, Therapeutic exercises/activities., Iontophoresis with 2.0 cc Dexamethasone, Strengthening, Orthotic Fabrication/Fit/Training, Edema Control, Scar Management, Wound Care, Electrical Modalities, Joint Protection, and Energy Conservation, as well as any other treatments deemed necessary based on the patient's needs or progress.         Updates/Grading for next session: continue with current POC      Fatmata Dominique OT

## 2022-12-28 ENCOUNTER — CLINICAL SUPPORT (OUTPATIENT)
Dept: REHABILITATION | Facility: HOSPITAL | Age: 57
End: 2022-12-28
Payer: MEDICAID

## 2022-12-28 DIAGNOSIS — M79.89 SWELLING OF RIGHT HAND: Primary | ICD-10-CM

## 2022-12-28 DIAGNOSIS — M25.641 STIFFNESS OF RIGHT HAND JOINT: ICD-10-CM

## 2022-12-28 DIAGNOSIS — M79.641 RIGHT HAND PAIN: ICD-10-CM

## 2022-12-28 DIAGNOSIS — Z78.9 DIFFICULTY WITH ACTIVITIES OF DAILY LIVING: ICD-10-CM

## 2022-12-28 PROCEDURE — 97530 THERAPEUTIC ACTIVITIES: CPT | Mod: PN

## 2022-12-28 NOTE — PROGRESS NOTES
"  Occupational Therapy Daily Treatment Note     Name: Donovan Bowen  Clinic Number: [id number]    Therapy Diagnosis:   Encounter Diagnoses   Name Primary?    Swelling of right hand Yes    Right hand pain     Stiffness of right hand joint     Difficulty with activities of daily living        Physician: Sid Esquivel MD    Visit Date: 12/28/2022    Physician Orders: Eval and treat   Medical Diagnosis: Arthritis of right hand    Arthritis of right hand [M19.041]    Evaluation Date: 11/9/2022  Plan of Care Certification Date: 12/28/22  Authorization Period: 11/4/22 - 12/31/22  Surgery Date and Procedure: 10/26/22   [place] ARTHROPLASTY MC-P JT,IMPLANT    ARTHROPLASTY HAND    Date of Return to MD: TBD     Per MD order 11/28:  "Active & Passive ROM; Deep Heat Massage, Aggressive Strengthening, Ultrasound, Paraffin Progressive Resistance Exercises"    Visit #: 13 / 12  FOTO 48%    Time In: 1530  Time Out: 1615  Total Billable Time: 45 minutes    Precautions:  Standard,  10 weeks post of as of 12/28    Subjective     Pt reports: He reports overall improvements and near PLOF, but 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 "My finger is bending much more with less pain now compared to prior to the surgery."   he was compliant with home exercise program given last session.   Response to previous treatment: increased range of motion  Functional change: increased range of motion    Pain: 1/10  Location: right long finger      Objective     Mental status: alert, oriented x3      Wound Assessment:   Size: 6 cm  Location: right long finger metacarpophalangeal joint      Edema: Circumferential measurements: In centimters       11/9/2022 11/9/2022 11/21/22 12/28/22     Left Right Right Right    Index:           P1 7.4 7.7 7.2 6.8   Long:           P1 7.4 8.8 7.6 7.7   P2 6.6 7.4 7.1 6.9        Right  11/9 Left  11/9 Right  11/21 Right   12/28   MPs 23.8 cm 22.5 cm 22 cm 22.4   PPC (Proximal " Cobb Crease) 23.9 cm 22.7 cm 22 cm    PWC (Proximal Wrist Crease) 18.9 cm 18.8 cm 18 cm          Range of Motion:      Wrist Left  11/9 Right  11/9 Right  11/21 Right   12/28   Extension 65 42 62 60   Flexion 68 30 60  70   Ulnar Deviation 40 20 20 30   Radial Deviation 20 20 20 18         Left Hand Finger ROM LONG  11/9  RING  11/9    MP  83 85   PIP  102 100   DIP  74 78      Right Hand Finger ROM LONG   11/9 RING  11/9 Long  11/21 Long  11/21 Long  12/21 Long  12/28   MP  50 45 55 65 68 69   PIP  53 73 86 95 97 100   DIP  25 30 53 61 60 63   ARGUELLES 128 148 194 221 225  232          Strength: (SETH Dynamometer in psi.)        11/9/2022 11/9/2022 11/30 12/21 12/28     Left Right Right  Right  Right    Rung  NT* 30 50 55         Pinch Strength (Measured in psi)       11/9/2022 11/9/2022 12/21/22 12/28     Left Right Right  Right    Khan Pinch 22  NT * 10 10   3pt Pinch 26  NT *     2pt Pinch    *thumb to middle finger  12  NT * 6       *NT due to post surgical precautions     Treatment     Donovan received the following direct contact modalities after being cleared for contraindications for 10 minutes:  - fluidotherapy to decrease pain and increase extensibility     Donovan received the following manual therapy techniques for 12 minutes:   -STM to scar and surrounding tissues   - Scar massage with dycem  - intrinsic stretch digits 2-5  - metacarpophalangeal joint stretch digits 2-5 held 30 seconds x 3    Donovan received therapeutic exercises for 23 minutes including:  - reverse joint blocking to long finger proximal interphalangeal joint with soft flexi kahlli 1 min   - middle and index stump squeezes with green sponge (adduction) 1 min  - active assist long finger extension 2 mins   - active long finger extension 2 mins  - brown gripper: 3rd rung  1 container pom pom pick ups   - wrist circumduction with small green theraball   - TGE's 30x  - blue flower band - digit extension 1 min   - green clip - pinching  with long finger and thumb 1 container pom pom pick ups      Home Exercises and Education Provided     Education provided:   - Progress towards goals     Written Home Exercises Provided: yes.  Exercises were reviewed and Donovan was able to demonstrate them prior to the end of the session.  Donovan demonstrated good  understanding of the HEP provided.      See EMR under Patient Instructions for exercises provided prior visit.      Assessment     Pt tolerated OT session well. Re-assessed all objective measures and he demonstrates improved edema, right wrist and long finger active range of motion, and right  strength. Pain reports have greatly improved, and he reports he's nearly back to his prior level of function.     Donovan is progressing well towards his goals and there are no updates to goals at this time. Pt prognosis is Good.     Pt will continue to benefit from skilled outpatient occupational therapy to address the deficits listed in the problem list on initial evaluation provide pt/family education and to maximize pt's level of independence in the home and community environment.     Anticipated barriers to occupational therapy: NONE    Pt's spiritual, cultural and educational needs considered and pt agreeable to plan of care and goals.    Goals:    LTG's (8 weeks):  1)   Increase right long finger metacarpophalangeal joint flexion to 75 degrees in to increase functional hand use for work duties Almost met - (69 degrees as of 12/28)  2)   Increase  strength to 50 lbs. 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 Ongoing   5)   Pt will return to near to prior level of function for ADLs and household management reporting I or Mod I with ADLs (dressing, feeding, grooming, toileting). Met  12/28  6)   Demonstrate a 1.0 decrease in long finger P1 swelling Met 12/28     STG's (4 weeks)  1)   Patient to be IND with HEP and modalities for pain/edema managment.  Met 12/7  2)   ncrease right long finger metacarpophalangeal joint flexion to 60 degrees in to increase functional hand use for work duties Met 12/21   3)   Increase  strength 20 lbs. to improve functional grasp for ADLs/work/leisure activities.  Met 11/30  4)   Increase right hand lateral pinch 5 psi's to increase independence with button and FM Coordination.  Met 12/28   5)   Patient to be IND wiht Orthotic use, wear and care precautions. Met 11/30  6)   Decrease complaints of pain to  1 out of 10 at worst to increase functional hand use for ADL/work/leisure activities. Ongoing   7)   Demonstrate a 0.5 decrease in long finger P1 swelling  Met 12/28           Plan      Continue to work towards goals per protocol.     Pt to be treated by Occupational Therapy 3x times per week for 8 weeks during the certification period from 11/9/2022 to 12/28/22 to achieve the established goals.      Treatment to include: Paraffin, Fluidotherapy, Manual therapy/joint mobilizations, Modalities for pain management, US 3 mhz, Therapeutic exercises/activities., Iontophoresis with 2.0 cc Dexamethasone, Strengthening, Orthotic Fabrication/Fit/Training, Edema Control, Scar Management, Wound Care, Electrical Modalities, Joint Protection, and Energy Conservation, as well as any other treatments deemed necessary based on the patient's needs or progress.         Updates/Grading for next session: continue with current POC      Fatmata Dominique OT

## 2022-12-29 NOTE — PROGRESS NOTES
"  Occupational Therapy Daily Treatment Note     Name: Donovan Bowen  Clinic Number: [id number]    Therapy Diagnosis:   Encounter Diagnoses   Name Primary?    Swelling of right hand Yes    Right hand pain     Stiffness of right hand joint     Difficulty with activities of daily living        Physician: Sid Esquivel MD    Visit Date: 12/30/2022    Physician Orders: Eval and treat   Medical Diagnosis: Arthritis of right hand    Arthritis of right hand [M19.041]    Evaluation Date: 11/9/2022  Plan of Care Certification Date: 2/22/23  Authorization Period: 11/4/22 - 12/31/22  Surgery Date and Procedure: 10/26/22   DE ARTHROPLASTY MC-P JT,IMPLANT    ARTHROPLASTY HAND    Date of Return to MD: TBD     Per MD order 11/28:  "Active & Passive ROM; Deep Heat Massage, Aggressive Strengthening, Ultrasound, Paraffin Progressive Resistance Exercises"    Visit #: 13 / 12  FOTO 48%    Time In: 1530  Time Out: 1615  Total Billable Time: 45 minutes    Precautions:  Standard,  10 weeks post of as of 12/28    Subjective     Pt reports: He reports that he isn't in any pain.   he was compliant with home exercise program given last session.   Response to previous treatment: increased range of motion  Functional change: increased range of motion    Pain: 0/10  Location: right long finger      Objective     Mental status: alert, oriented x3      Wound Assessment:   Size: 6 cm  Location: right long finger metacarpophalangeal joint      Edema: Circumferential measurements: In centimters       11/9/2022 11/9/2022 11/21/22 12/28/22     Left Right Right Right    Index:           P1 7.4 7.7 7.2 6.8   Long:           P1 7.4 8.8 7.6 7.7   P2 6.6 7.4 7.1 6.9        Right  11/9 Left  11/9 Right  11/21 Right   12/28   MPs 23.8 cm 22.5 cm 22 cm 22.4   PPC (Proximal Cobb Crease) 23.9 cm 22.7 cm 22 cm    PWC (Proximal Wrist Crease) 18.9 cm 18.8 cm 18 cm          Range of Motion:      Wrist Left  11/9 Right  11/9 Right  11/21 Right   12/28   Extension " 65 42 62 60   Flexion 68 30 60  70   Ulnar Deviation 40 20 20 30   Radial Deviation 20 20 20 18         Left Hand Finger ROM LONG  11/9  RING  11/9    MP  83 85   PIP  102 100   DIP  74 78      Right Hand Finger ROM LONG   11/9 RING  11/9 Long  11/21 Long  11/21 Long  12/21 Long  12/28   MP  50 45 55 65 68 69   PIP  53 73 86 95 97 100   DIP  25 30 53 61 60 63   ARGUELLES 128 148 194 221 225  232          Strength: (SETH Dynamometer in psi.)        11/9/2022 11/9/2022 11/30 12/21 12/28     Left Right Right  Right  Right    Rung  NT* 30 50 55         Pinch Strength (Measured in psi)       11/9/2022 11/9/2022 12/21/22 12/28     Left Right Right  Right    Khan Pinch 22  NT * 10 10   3pt Pinch 26  NT *     2pt Pinch    *thumb to middle finger  12  NT * 6       *NT due to post surgical precautions     Treatment     Donovan received the following direct contact modalities after being cleared for contraindications for 10 minutes:  - fluidotherapy to decrease pain and increase extensibility     Donovan received the following manual therapy techniques for 12 minutes:   -STM to scar and surrounding tissues   - Scar massage with dycem  - intrinsic stretch digits 2-5  - metacarpophalangeal joint stretch digits 2-5 held 30 seconds x 3    Donovan received therapeutic exercises for 23 minutes including:  - reverse joint blocking to long finger proximal interphalangeal joint with soft flexi kahlil 1 min   - middle and index stump squeezes with green sponge (adduction) 1 min  - active assist long finger extension 2 mins   - active long finger extension 2 mins  - brown gripper: 3rd rung  1 container pom pom pick ups   - wrist circumduction with small green theraball   - TGE's 30x  - blue flower band - digit extension 1 min   - green clip - pinching with long finger and thumb 1 container pom pom pick ups      Home Exercises and Education Provided     Education provided:   - Progress towards goals     Written Home Exercises Provided:  yes.  Exercises were reviewed and Donovan was able to demonstrate them prior to the end of the session.  Donovan demonstrated good  understanding of the HEP provided.      See EMR under Patient Instructions for exercises provided prior visit.      Assessment     Pt tolerated OT session well. Pain reports have greatly improved, and he reports he's nearly back to his prior level of function. Pt has progressed in overall strength, endurance, coordination, range of motion and edema enabling him to participate in ADLs without assistance.     Donovan is progressing well towards his goals and there are no updates to goals at this time. Pt prognosis is Good.     Pt will continue to benefit from skilled outpatient occupational therapy to address the deficits listed in the problem list on initial evaluation provide pt/family education and to maximize pt's level of independence in the home and community environment.     Anticipated barriers to occupational therapy: NONE    Pt's spiritual, cultural and educational needs considered and pt agreeable to plan of care and goals.    Goals:    LTG's (8 weeks):  1)   Increase right long finger metacarpophalangeal joint flexion to 75 degrees in to increase functional hand use for work duties Almost met - (69 degrees as of 12/28)  2)   Increase  strength to 50 lbs. 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 Ongoing   5)   Pt will return to near to prior level of function for ADLs and household management reporting I or Mod I with ADLs (dressing, feeding, grooming, toileting). Met 12/28  6)   Demonstrate a 1.0 decrease in long finger P1 swelling Met 12/28     STG's (4 weeks)  1)   Patient to be IND with HEP and modalities for pain/edema managment.  Met 12/7  2)    ncrease right long finger metacarpophalangeal joint flexion to 60 degrees in to increase functional hand use for work duties Met 12/21   3)   Increase  strength 20 lbs. to improve functional grasp for ADLs/work/leisure activities.  Met 11/30  4)   Increase right hand lateral pinch 5 psi's to increase independence with button and FM Coordination.  Met 12/28   5)   Patient to be IND wiht Orthotic use, wear and care precautions. Met 11/30  6)   Decrease complaints of pain to  1 out of 10 at worst to increase functional hand use for ADL/work/leisure activities. Ongoing   7)   Demonstrate a 0.5 decrease in long finger P1 swelling  Met 12/28           Plan      Continue to work towards goals per protocol.     Pt to be treated by Occupational Therapy 3x times per week for 8 weeks during the certification period from 12/28/22 to 2/22/23 to achieve the established goals.      Treatment to include: Paraffin, Fluidotherapy, Manual therapy/joint mobilizations, Modalities for pain management, US 3 mhz, Therapeutic exercises/activities., Iontophoresis with 2.0 cc Dexamethasone, Strengthening, Orthotic Fabrication/Fit/Training, Edema Control, Scar Management, Wound Care, Electrical Modalities, Joint Protection, and Energy Conservation, as well as any other treatments deemed necessary based on the patient's needs or progress.         Updates/Grading for next session: continue with current POC      Zahira Muse, OT

## 2022-12-30 ENCOUNTER — CLINICAL SUPPORT (OUTPATIENT)
Dept: REHABILITATION | Facility: HOSPITAL | Age: 57
End: 2022-12-30
Payer: MEDICAID

## 2022-12-30 DIAGNOSIS — Z78.9 DIFFICULTY WITH ACTIVITIES OF DAILY LIVING: ICD-10-CM

## 2022-12-30 DIAGNOSIS — M25.641 STIFFNESS OF RIGHT HAND JOINT: ICD-10-CM

## 2022-12-30 DIAGNOSIS — M79.89 SWELLING OF RIGHT HAND: Primary | ICD-10-CM

## 2022-12-30 DIAGNOSIS — M79.641 RIGHT HAND PAIN: ICD-10-CM

## 2022-12-30 PROCEDURE — 97530 THERAPEUTIC ACTIVITIES: CPT | Mod: PN

## 2022-12-30 NOTE — PLAN OF CARE
"  [facility]  Occupational Therapy Plan of Care Note    Name: Donovan Bowen  Clinic Number: [id number]    Therapy Diagnosis:   Encounter Diagnoses   Name Primary?    Swelling of right hand Yes    Right hand pain     Stiffness of right hand joint     Difficulty with activities of daily living      Physician: Sid Esquivel MD    Visit Date: 12/28/2022    Physician Orders: Eval and treat   Medical Diagnosis: Arthritis of right hand    Arthritis of right hand [M19.041]    Evaluation Date: 12/28/2022  Authorization Period Expiration: 12/28/22 -  2/22/23  Plan of Care Expiration: 2/22/23  Progress Note Due: 2/22/23   Visit # / Visits authorized: 14 / 12  FOTO: 2 / 3    Precautions: Standard  Functional Level Prior to Evaluation:  Independent     SUBJECTIVE     Pt reports: He reports overall improvements and near PLOF, but 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 "My finger is bending much more with less pain now compared to prior to the surgery."   he was compliant with home exercise program given last session.   Response to previous treatment: increased range of motion  Functional change: increased range of motion    Pain: 1/10  Location: right long finger      OBJECTIVE     Mental status: alert, oriented x3      Wound Assessment:   Size: 6 cm  Location: right long finger metacarpophalangeal joint      Edema: Circumferential measurements: In centimters       11/9/2022 11/9/2022 11/21/22 12/28/22     Left Right Right Right    Index:           P1 7.4 7.7 7.2 6.8   Long:           P1 7.4 8.8 7.6 7.7   P2 6.6 7.4 7.1 6.9        Right  11/9 Left  11/9 Right  11/21 Right   12/28   MPs 23.8 cm 22.5 cm 22 cm 22.4   PPC (Proximal Cobb Crease) 23.9 cm 22.7 cm 22 cm    PWC (Proximal Wrist Crease) 18.9 cm 18.8 cm 18 cm          Range of Motion:      Wrist Left  11/9 Right  11/9 Right  11/21 Right   12/28   Extension 65 42 62 60   Flexion 68 30 60  70 "   Ulnar Deviation 40 20 20 30   Radial Deviation 20 20 20 18         Left Hand Finger ROM LONG  11/9  RING  11/9    MP  83 85   PIP  102 100   DIP  74 78      Right Hand Finger ROM LONG   11/9 RING  11/9 Long  11/21 Long  11/21 Long  12/21 Long  12/28   MP  50 45 55 65 68 69   PIP  53 73 86 95 97 100   DIP  25 30 53 61 60 63   ARGUELLES 128 148 194 221 225  232          Strength: (SETH Dynamometer in psi.)        11/9/2022 11/9/2022 11/30 12/21 12/28     Left Right Right  Right  Right    Rung  NT* 30 50 55         Pinch Strength (Measured in psi)       11/9/2022 11/9/2022 12/21/22 12/28     Left Right Right  Right    Khan Pinch 22  NT * 10 10   3pt Pinch 26  NT *     2pt Pinch    *thumb to middle finger  12  NT * 6       *NT due to post surgical precautions    ASSESSMENT     Original goals set at initial evaluation:    LTG's (8 weeks):  1)   Increase right long finger metacarpophalangeal joint flexion to 75 degrees in to increase functional hand use for work duties Almost met - (69 degrees as of 12/28)  2)   Increase  strength to 50 lbs. 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 Ongoing   5)   Pt will return to near to prior level of function for ADLs and household management reporting I or Mod I with ADLs (dressing, feeding, grooming, toileting). Met 12/28  6)   Demonstrate a 1.0 decrease in long finger P1 swelling Met 12/28     STG's (4 weeks)  1)   Patient to be IND with HEP and modalities for pain/edema managment.  Met 12/7  2)   ncrease right long finger metacarpophalangeal joint flexion to 60 degrees in to increase functional hand use for work duties Met 12/21   3)   Increase  strength 20 lbs. to improve functional grasp for ADLs/work/leisure activities.  Met 11/30  4)   Increase  right hand lateral pinch 5 psi's to increase independence with button and FM Coordination.  Met 12/28   5)   Patient to be IND wiht Orthotic use, wear and care precautions. Met 11/30  6)   Decrease complaints of pain to  1 out of 10 at worst to increase functional hand use for ADL/work/leisure activities. Met 12/28  7)   Demonstrate a 0.5 decrease in long finger P1 swelling  Met 12/28        Previous Short Term Goals Status:   All STGs met  New Short Term Goals Status:   No updates  Long Term Goal Status:  3 / 6 LTGs met. See updated goals below  Reasons for Recertification of Therapy:   Mr. Man has made great progress in therapy s/p right long finger metacarpophalangeal joint arthroplasty. Re-assessed all objective measures today and he demonstrates overall improved edema, right wrist and long finger active range of motion, and right  strength. Pain reports have greatly improved, and he reports he's nearly back to his prior level of function. He does report residual weakness and pain during his job duties (owning an automotive repair shop). He would benefit from continued skilled OT services for additional progressive strengthening and for a final updated home exercise program.       Updated plan of care goals as of 12/28:   1)  Increase right long finger metacarpophalangeal joint flexion to 75 degrees in to increase functional hand use for work duties  2)   Increase right hand lateral pinch to 15 psis for heavier tasks around his  shop    3)   Patient to score at 20% or less on FOTO to demonstrate improved perception of functional right upper extremity use.   4)   Pt will report decreased episodes of pain when using right hand to turn the key in a car ignition/using a key to open a door       PLAN     Updated Certification Period: 12/28/23 to 2/22/23   Recommended Treatment Plan: 1x times per week for 8 weeks:  Fluidotherapy, Manual Therapy, Moist Heat/ Ice, Neuromuscular Re-ed, Orthotic Management  and Training, Paraffin, Patient Education, Self Care, Therapeutic Activities, Therapeutic Exercise, and Ultrasound  Other Recommendations: none     Fatmata Dominique OT    I CERTIFY THE NEED FOR THESE SERVICES FURNISHED UNDER THIS PLAN OF TREATMENT AND WHILE UNDER MY CARE  Physician's comments:      Physician's Signature: ___________________________________________________

## 2023-01-04 ENCOUNTER — CLINICAL SUPPORT (OUTPATIENT)
Dept: REHABILITATION | Facility: HOSPITAL | Age: 58
End: 2023-01-04
Payer: MEDICAID

## 2023-01-04 DIAGNOSIS — M79.89 SWELLING OF RIGHT HAND: Primary | ICD-10-CM

## 2023-01-04 DIAGNOSIS — M25.641 STIFFNESS OF RIGHT HAND JOINT: ICD-10-CM

## 2023-01-04 DIAGNOSIS — Z78.9 DIFFICULTY WITH ACTIVITIES OF DAILY LIVING: ICD-10-CM

## 2023-01-04 DIAGNOSIS — M79.641 RIGHT HAND PAIN: ICD-10-CM

## 2023-01-04 PROCEDURE — 97530 THERAPEUTIC ACTIVITIES: CPT | Mod: PN

## 2023-01-04 NOTE — PROGRESS NOTES
"  Occupational Therapy Daily Treatment Note     Name: Donovan Bowen  Clinic Number: [id number]    Therapy Diagnosis:   Encounter Diagnoses   Name Primary?    Swelling of right hand Yes    Right hand pain     Stiffness of right hand joint     Difficulty with activities of daily living        Physician: Sid Esquivel MD    Visit Date: 1/4/2023    Physician Orders: Eval and treat   Medical Diagnosis: Arthritis of right hand    Arthritis of right hand [M19.041]    Evaluation Date: 11/9/2022  Plan of Care Certification Date: 2/22/23  Authorization Period: 11/4/22 - 12/31/22  Surgery Date and Procedure: 10/26/22   DC ARTHROPLASTY MC-P JT,IMPLANT    ARTHROPLASTY HAND    Date of Return to MD: TBD     Per MD order 11/28:  "Active & Passive ROM; Deep Heat Massage, Aggressive Strengthening, Ultrasound, Paraffin Progressive Resistance Exercises"    Visit #: 13 / 12  FOTO 48%    Time In: 1445  Time Out: 1530  Total Billable Time: 45 minutes    Precautions:  Standard,  10 weeks post of as of 12/28    Subjective     Pt reports: He reports great functional improvements and significant decrease in pain since starting therapy  he was compliant with home exercise program given last session.   Response to previous treatment: increased range of motion  Functional change: increased range of motion        Pain: 0/10  Location: right long finger      Objective     Mental status: alert, oriented x3      Wound Assessment:   Size: 6 cm  Location: right long finger metacarpophalangeal joint      Edema: Circumferential measurements: In centimters       11/9/2022 11/9/2022 11/21/22 12/28/22     Left Right Right Right    Index:           P1 7.4 7.7 7.2 6.8   Long:           P1 7.4 8.8 7.6 7.7   P2 6.6 7.4 7.1 6.9        Right  11/9 Left  11/9 Right  11/21 Right   12/28   MPs 23.8 cm 22.5 cm 22 cm 22.4   PPC (Proximal Cobb Crease) 23.9 cm 22.7 cm 22 cm    PWC (Proximal Wrist Crease) 18.9 cm 18.8 cm 18 cm          Range of Motion:      Wrist " Left  11/9 Right  11/9 Right  11/21 Right   12/28   Extension 65 42 62 60   Flexion 68 30 60  70   Ulnar Deviation 40 20 20 30   Radial Deviation 20 20 20 18         Left Hand Finger ROM LONG  11/9  RING  11/9    MP  83 85   PIP  102 100   DIP  74 78      Right Hand Finger ROM LONG   11/9 RING  11/9 Long  11/21 Long  11/21 Long  12/21 Long  12/28   MP  50 45 55 65 68 69   PIP  53 73 86 95 97 100   DIP  25 30 53 61 60 63   ARGUELLES 128 148 194 221 225  232          Strength: (SETH Dynamometer in psi.)        11/9/2022 11/9/2022 11/30 12/21 12/28     Left Right Right  Right  Right    Rung  NT* 30 50 55         Pinch Strength (Measured in psi)       11/9/2022 11/9/2022 12/21/22 12/28     Left Right Right  Right    Khan Pinch 22  NT * 10 10   3pt Pinch 26  NT *     2pt Pinch    *thumb to middle finger  12  NT * 6       *NT due to post surgical precautions     Treatment     Donovan received the following direct contact modalities after being cleared for contraindications for 10 minutes:  - fluidotherapy to decrease pain and increase extensibility     Donovan received the following manual therapy techniques for 12 minutes:   - intrinsic stretch digits 2-5  - metacarpophalangeal joint stretch digits 2-5 held 30 seconds x 3    Donovan received therapeutic exercises for 23 minutes including:  - reverse joint blocking to long finger proximal interphalangeal joint with soft flexi kahlil 1 min   - 2nd and 3rd adduction squeeze with clothespin - smallest pom pom pick ups   - 3rd and 4th adduction squeeze with clothespin - smallest pom pom pick ups   - active assist long finger extension 2 mins   - brown gripper: 3rd rung  1 container pom pom pick ups   - wrist circumduction with small green theraball 1 min  - wrist 3 ways over wedge with 4 lbs   - green theraband smiles, frowns x20  - blue flower band - digit extension 1 min   - pinching with long finger and thumb: 1 container pom pom pick ups with green clip and sponges, 1  container with blue clip       Home Exercises and Education Provided     Education provided:   - Progress towards goals     Written Home Exercises Provided: yes.  Exercises were reviewed and Donovan was able to demonstrate them prior to the end of the session.  Donovan demonstrated good  understanding of the HEP provided.      See EMR under Patient Instructions for exercises provided prior visit.      Assessment     Pt tolerated OT session well. Pain reports have greatly improved, and he reports he's nearly back to his prior level of function. He has a follow up visit with MD this Monday 1/9/22 and he will decide then if he wants to continue therapy.     Donovan is progressing well towards his goals and there are no updates to goals at this time. Pt prognosis is Good.     Pt will continue to benefit from skilled outpatient occupational therapy to address the deficits listed in the problem list on initial evaluation provide pt/family education and to maximize pt's level of independence in the home and community environment.     Anticipated barriers to occupational therapy: NONE    Pt's spiritual, cultural and educational needs considered and pt agreeable to plan of care and goals.    Goals:  1)  Increase right long finger metacarpophalangeal joint flexion to 75 degrees in to increase functional hand use for work duties  Ongoing   2)   Increase right hand lateral pinch to 15 psis for heavier tasks around his  shop   Almost met (10 psi's as of 12/28/22)  3)   Patient to score at 20% or less on FOTO to demonstrate improved perception of functional right upper extremity use.  Almost met (20.5%)  4)   Pt will report decreased episodes of pain when using right hand to turn the key in a car ignition/using a key to open a door  Ongoing            Plan      Continue to work towards goals per protocol. Quick D/C if pt doesn't wish to return to therapy.       Pt to be treated by Occupational Therapy 3x times per week for  8 weeks during the certification period from 12/28/22 to 2/22/23 to achieve the established goals.      Treatment to include: Paraffin, Fluidotherapy, Manual therapy/joint mobilizations, Modalities for pain management, US 3 mhz, Therapeutic exercises/activities., Iontophoresis with 2.0 cc Dexamethasone, Strengthening, Orthotic Fabrication/Fit/Training, Edema Control, Scar Management, Wound Care, Electrical Modalities, Joint Protection, and Energy Conservation, as well as any other treatments deemed necessary based on the patient's needs or progress.         Updates/Grading for next session: continue with current POC      Fatmata Dominique, OT